# Patient Record
Sex: FEMALE | Race: BLACK OR AFRICAN AMERICAN | NOT HISPANIC OR LATINO | Employment: FULL TIME | URBAN - METROPOLITAN AREA
[De-identification: names, ages, dates, MRNs, and addresses within clinical notes are randomized per-mention and may not be internally consistent; named-entity substitution may affect disease eponyms.]

---

## 2017-10-16 ENCOUNTER — TELEPHONE (OUTPATIENT)
Dept: OBSTETRICS AND GYNECOLOGY | Facility: CLINIC | Age: 22
End: 2017-10-16

## 2017-10-16 NOTE — TELEPHONE ENCOUNTER
----- Message from RT Cruzito sent at 10/16/2017 10:47 AM CDT -----  Contact: pt    pt , requesting a new OB appt, stated her pregnancy was confirmed by [facility] [place] [facility]  thanks.

## 2017-10-18 ENCOUNTER — INITIAL PRENATAL (OUTPATIENT)
Dept: OBSTETRICS AND GYNECOLOGY | Facility: CLINIC | Age: 22
End: 2017-10-18
Payer: MEDICAID

## 2017-10-18 VITALS — SYSTOLIC BLOOD PRESSURE: 124 MMHG | WEIGHT: 172.38 LBS | DIASTOLIC BLOOD PRESSURE: 76 MMHG

## 2017-10-18 DIAGNOSIS — Z34.81 PRENATAL CARE, SUBSEQUENT PREGNANCY IN FIRST TRIMESTER: ICD-10-CM

## 2017-10-18 DIAGNOSIS — Z3A.09 9 WEEKS GESTATION OF PREGNANCY: Primary | ICD-10-CM

## 2017-10-18 PROCEDURE — 99999 PR PBB SHADOW E&M-EST. PATIENT-LVL III: CPT | Mod: PBBFAC,,, | Performed by: OBSTETRICS & GYNECOLOGY

## 2017-10-18 PROCEDURE — 76817 TRANSVAGINAL US OBSTETRIC: CPT | Mod: 26,S$PBB,, | Performed by: OBSTETRICS & GYNECOLOGY

## 2017-10-18 PROCEDURE — 99203 OFFICE O/P NEW LOW 30 MIN: CPT | Mod: 25,TH,S$PBB, | Performed by: OBSTETRICS & GYNECOLOGY

## 2017-10-18 PROCEDURE — 87591 N.GONORRHOEAE DNA AMP PROB: CPT

## 2017-10-18 PROCEDURE — 76817 TRANSVAGINAL US OBSTETRIC: CPT | Mod: PBBFAC,PN | Performed by: OBSTETRICS & GYNECOLOGY

## 2017-10-18 PROCEDURE — 88142 CYTOPATH C/V THIN LAYER: CPT

## 2017-10-18 PROCEDURE — 99213 OFFICE O/P EST LOW 20 MIN: CPT | Mod: PBBFAC,PN | Performed by: OBSTETRICS & GYNECOLOGY

## 2017-10-18 PROCEDURE — 87624 HPV HI-RISK TYP POOLED RSLT: CPT

## 2017-10-18 NOTE — PROGRESS NOTES
Patient reports initiating OCP's at end of August 2017 and then discontinuing pill last week due to positive UPT. No problems reported today. Ultrasound today reveals single IUP at 8w6d based on CRL. Pap smear and cultures done today and patient to get prenatal labs.

## 2017-10-19 ENCOUNTER — TELEPHONE (OUTPATIENT)
Dept: OBSTETRICS AND GYNECOLOGY | Facility: CLINIC | Age: 22
End: 2017-10-19

## 2017-10-19 LAB
C TRACH DNA SPEC QL NAA+PROBE: NOT DETECTED
N GONORRHOEA DNA SPEC QL NAA+PROBE: NOT DETECTED

## 2017-10-19 NOTE — TELEPHONE ENCOUNTER
----- Message from Gaye Camejo sent at 10/19/2017 11:35 AM CDT -----  Contact: ruben durand HR at [facility] ph#[phone]   ruben durand HR at [facility] ph#[phone] requesting a call from nurse, need to verify patient excuse, need correct due date and how frequent patient need to come to doctor visits.  She questioning the information given by doctor it shows patient due date 5/2017 and patient now pregnant and that date has passed.

## 2017-10-24 LAB
HPV16 AG SPEC QL: NEGATIVE
HPV16+18+H RISK 12 DNA CVX-IMP: NEGATIVE
HPV18 DNA SPEC QL NAA+PROBE: NEGATIVE

## 2020-04-21 DIAGNOSIS — Z01.84 ANTIBODY RESPONSE EXAMINATION: ICD-10-CM

## 2020-05-21 DIAGNOSIS — Z01.84 ANTIBODY RESPONSE EXAMINATION: ICD-10-CM

## 2020-06-20 DIAGNOSIS — Z01.84 ANTIBODY RESPONSE EXAMINATION: ICD-10-CM

## 2020-07-20 DIAGNOSIS — Z01.84 ANTIBODY RESPONSE EXAMINATION: ICD-10-CM

## 2020-08-19 DIAGNOSIS — Z01.84 ANTIBODY RESPONSE EXAMINATION: ICD-10-CM

## 2020-09-18 DIAGNOSIS — Z01.84 ANTIBODY RESPONSE EXAMINATION: ICD-10-CM

## 2020-10-18 DIAGNOSIS — Z01.84 ANTIBODY RESPONSE EXAMINATION: ICD-10-CM

## 2020-11-17 DIAGNOSIS — Z01.84 ANTIBODY RESPONSE EXAMINATION: ICD-10-CM

## 2020-12-28 ENCOUNTER — OFFICE VISIT (OUTPATIENT)
Dept: FAMILY MEDICINE | Facility: CLINIC | Age: 25
End: 2020-12-28
Payer: MEDICAID

## 2020-12-28 ENCOUNTER — TELEPHONE (OUTPATIENT)
Dept: PRIMARY CARE CLINIC | Facility: OTHER | Age: 25
End: 2020-12-28

## 2020-12-28 ENCOUNTER — CLINICAL SUPPORT (OUTPATIENT)
Dept: URGENT CARE | Facility: CLINIC | Age: 25
End: 2020-12-28
Payer: COMMERCIAL

## 2020-12-28 VITALS
TEMPERATURE: 98 F | BODY MASS INDEX: 30.67 KG/M2 | SYSTOLIC BLOOD PRESSURE: 112 MMHG | HEART RATE: 120 BPM | HEIGHT: 65 IN | DIASTOLIC BLOOD PRESSURE: 80 MMHG | WEIGHT: 184.06 LBS

## 2020-12-28 DIAGNOSIS — R52 BODY ACHES: ICD-10-CM

## 2020-12-28 DIAGNOSIS — J03.90 TONSILLITIS: Primary | ICD-10-CM

## 2020-12-28 DIAGNOSIS — J02.9 SORE THROAT: ICD-10-CM

## 2020-12-28 DIAGNOSIS — R05.9 COUGH: ICD-10-CM

## 2020-12-28 DIAGNOSIS — R05.9 COUGH: Primary | ICD-10-CM

## 2020-12-28 LAB
CTP QC/QA: YES
SARS-COV-2 RDRP RESP QL NAA+PROBE: NEGATIVE

## 2020-12-28 PROCEDURE — U0002: ICD-10-PCS | Mod: QW,S$GLB,, | Performed by: INTERNAL MEDICINE

## 2020-12-28 PROCEDURE — 87147 CULTURE TYPE IMMUNOLOGIC: CPT | Mod: 59

## 2020-12-28 PROCEDURE — U0002 COVID-19 LAB TEST NON-CDC: HCPCS | Mod: QW,S$GLB,, | Performed by: INTERNAL MEDICINE

## 2020-12-28 PROCEDURE — 99203 OFFICE O/P NEW LOW 30 MIN: CPT | Mod: S$GLB,,, | Performed by: PHYSICIAN ASSISTANT

## 2020-12-28 PROCEDURE — 99203 PR OFFICE/OUTPT VISIT, NEW, LEVL III, 30-44 MIN: ICD-10-PCS | Mod: S$GLB,,, | Performed by: PHYSICIAN ASSISTANT

## 2020-12-28 PROCEDURE — 87081 CULTURE SCREEN ONLY: CPT

## 2020-12-28 RX ORDER — AZITHROMYCIN 250 MG/1
TABLET, FILM COATED ORAL
Qty: 6 TABLET | Refills: 0 | Status: SHIPPED | OUTPATIENT
Start: 2020-12-28 | End: 2021-05-03

## 2020-12-28 NOTE — PROGRESS NOTES
Subjective:       Patient ID: Rin Baum is a 25 y.o. female.    Chief Complaint: Sore Throat    Sore Throat   This is a new problem. The current episode started yesterday. The problem has been rapidly worsening. There has been no fever. The pain is moderate. Associated symptoms include headaches, swollen glands and trouble swallowing. Pertinent negatives include no congestion, coughing, ear pain or stridor. She has had exposure to strep. She has had no exposure to mono. Exposure to: recent COVID swab was negative. . She has tried nothing for the symptoms.     Past Medical History:   Diagnosis Date    Hypertension        Review of Systems   Constitutional: Negative for activity change, appetite change, chills, fatigue and fever.   HENT: Positive for postnasal drip, sore throat and trouble swallowing. Negative for nasal congestion and ear pain.    Respiratory: Negative for cough, chest tightness and stridor.    Cardiovascular: Negative for chest pain.   Neurological: Positive for headaches.         Objective:      Physical Exam  Vitals signs reviewed.   Constitutional:       General: She is not in acute distress.     Appearance: Normal appearance. She is ill-appearing. She is not toxic-appearing or diaphoretic.   HENT:      Head: Normocephalic and atraumatic.      Right Ear: Tympanic membrane and ear canal normal.      Left Ear: Tympanic membrane and ear canal normal.      Nose:      Right Turbinates: Swollen. Not enlarged or pale.      Left Turbinates: Swollen. Not enlarged or pale.      Right Sinus: No maxillary sinus tenderness or frontal sinus tenderness.      Left Sinus: No maxillary sinus tenderness or frontal sinus tenderness.      Mouth/Throat:      Pharynx: Posterior oropharyngeal erythema present. No pharyngeal swelling, oropharyngeal exudate or uvula swelling.      Tonsils: No tonsillar exudate or tonsillar abscesses. 3+ on the right. 3+ on the left.   Cardiovascular:      Rate and Rhythm:  Regular rhythm. Tachycardia present.      Pulses: Normal pulses.      Heart sounds: Normal heart sounds. No murmur. No friction rub. No gallop.    Pulmonary:      Effort: Pulmonary effort is normal. No respiratory distress.      Breath sounds: Normal breath sounds. No stridor. No wheezing, rhonchi or rales.   Chest:      Chest wall: No tenderness.   Abdominal:      General: Abdomen is flat.      Tenderness: There is no abdominal tenderness.   Neurological:      Mental Status: She is alert.         Assessment:       1. Tonsillitis        Plan:       Tonsillitis  -     POCT rapid strep A  -     Strep A culture, throat    Other orders  -     azithromycin (Z-HOUSTON) 250 MG tablet; Follow instructions on pack.  Dispense: 6 tablet; Refill: 0

## 2020-12-28 NOTE — TELEPHONE ENCOUNTER
Call #1 Attempted to contact patient to discuss NEGATIVE COVID-19 result. No answer, LM with [facility] callback number and cleared to RTW 12/29/2020. [facility] 12/28/2020

## 2020-12-28 NOTE — PATIENT INSTRUCTIONS

## 2020-12-31 ENCOUNTER — TELEPHONE (OUTPATIENT)
Dept: FAMILY MEDICINE | Facility: CLINIC | Age: 25
End: 2020-12-31

## 2020-12-31 DIAGNOSIS — Z30.9 ENCOUNTER FOR CONTRACEPTIVE MANAGEMENT, UNSPECIFIED TYPE: Primary | ICD-10-CM

## 2020-12-31 LAB
BACTERIA THROAT CULT: ABNORMAL
BACTERIA THROAT CULT: ABNORMAL

## 2020-12-31 RX ORDER — LEVONORGESTREL AND ETHINYL ESTRADIOL 0.15-0.03
1 KIT ORAL DAILY
Qty: 30 TABLET | Refills: 2 | Status: SHIPPED | OUTPATIENT
Start: 2020-12-31 | End: 2022-12-29

## 2020-12-31 NOTE — TELEPHONE ENCOUNTER
Pt requesting RF of BCPs.  No problems with current therapy.  Missed her Pap this week when she was sick.  Call in to GYN to reschedule.  Well-appearing 25 yr old female in NAD.  Nadege Diana, VIKAS, CNP, FNP-BC  [facility]

## 2021-01-02 RX ORDER — AMOXICILLIN 875 MG/1
875 TABLET, FILM COATED ORAL 2 TIMES DAILY
Qty: 14 TABLET | Refills: 0 | Status: SHIPPED | OUTPATIENT
Start: 2021-01-02 | End: 2021-01-09

## 2021-05-03 ENCOUNTER — OFFICE VISIT (OUTPATIENT)
Dept: FAMILY MEDICINE | Facility: CLINIC | Age: 26
End: 2021-05-03
Payer: COMMERCIAL

## 2021-05-03 VITALS
BODY MASS INDEX: 31.22 KG/M2 | TEMPERATURE: 98 F | WEIGHT: 187.63 LBS | DIASTOLIC BLOOD PRESSURE: 78 MMHG | SYSTOLIC BLOOD PRESSURE: 124 MMHG | HEART RATE: 88 BPM

## 2021-05-03 DIAGNOSIS — G51.8 FACIAL NEURITIS: Primary | ICD-10-CM

## 2021-05-03 PROCEDURE — 3008F PR BODY MASS INDEX (BMI) DOCUMENTED: ICD-10-PCS | Mod: CPTII,S$GLB,, | Performed by: PHYSICIAN ASSISTANT

## 2021-05-03 PROCEDURE — 99214 OFFICE O/P EST MOD 30 MIN: CPT | Mod: 25,S$GLB,, | Performed by: PHYSICIAN ASSISTANT

## 2021-05-03 PROCEDURE — 1125F AMNT PAIN NOTED PAIN PRSNT: CPT | Mod: S$GLB,,, | Performed by: PHYSICIAN ASSISTANT

## 2021-05-03 PROCEDURE — 96372 THER/PROPH/DIAG INJ SC/IM: CPT | Mod: S$GLB,,, | Performed by: PHYSICIAN ASSISTANT

## 2021-05-03 PROCEDURE — 1125F PR PAIN SEVERITY QUANTIFIED, PAIN PRESENT: ICD-10-PCS | Mod: S$GLB,,, | Performed by: PHYSICIAN ASSISTANT

## 2021-05-03 PROCEDURE — 99214 PR OFFICE/OUTPT VISIT, EST, LEVL IV, 30-39 MIN: ICD-10-PCS | Mod: 25,S$GLB,, | Performed by: PHYSICIAN ASSISTANT

## 2021-05-03 PROCEDURE — 96372 PR INJECTION,THERAP/PROPH/DIAG2ST, IM OR SUBCUT: ICD-10-PCS | Mod: S$GLB,,, | Performed by: PHYSICIAN ASSISTANT

## 2021-05-03 PROCEDURE — 3008F BODY MASS INDEX DOCD: CPT | Mod: CPTII,S$GLB,, | Performed by: PHYSICIAN ASSISTANT

## 2021-05-03 RX ORDER — PREDNISONE 10 MG/1
TABLET ORAL
Qty: 18 TABLET | Refills: 0 | Status: SHIPPED | OUTPATIENT
Start: 2021-05-03 | End: 2022-12-29

## 2021-05-03 RX ORDER — KETOROLAC TROMETHAMINE 30 MG/ML
60 INJECTION, SOLUTION INTRAMUSCULAR; INTRAVENOUS ONCE
Status: COMPLETED | OUTPATIENT
Start: 2021-05-03 | End: 2021-05-03

## 2021-05-03 RX ORDER — KETOROLAC TROMETHAMINE 30 MG/ML
60 INJECTION, SOLUTION INTRAMUSCULAR; INTRAVENOUS
Status: DISCONTINUED | OUTPATIENT
Start: 2021-05-03 | End: 2021-05-03

## 2021-05-03 RX ADMIN — KETOROLAC TROMETHAMINE 60 MG: 30 INJECTION, SOLUTION INTRAMUSCULAR; INTRAVENOUS at 03:05

## 2021-05-12 ENCOUNTER — PATIENT MESSAGE (OUTPATIENT)
Dept: RESEARCH | Facility: HOSPITAL | Age: 26
End: 2021-05-12

## 2021-10-27 ENCOUNTER — TELEPHONE (OUTPATIENT)
Dept: FAMILY MEDICINE | Facility: CLINIC | Age: 26
End: 2021-10-27
Payer: COMMERCIAL

## 2021-10-27 DIAGNOSIS — M79.605 LEG PAIN, LEFT: Primary | ICD-10-CM

## 2021-12-08 DIAGNOSIS — M25.572 ACUTE LEFT ANKLE PAIN: Primary | ICD-10-CM

## 2021-12-09 ENCOUNTER — HOSPITAL ENCOUNTER (OUTPATIENT)
Dept: RADIOLOGY | Facility: HOSPITAL | Age: 26
Discharge: HOME | End: 2021-12-09
Attending: PHYSICAL MEDICINE & REHABILITATION
Payer: COMMERCIAL

## 2021-12-09 ENCOUNTER — OFFICE VISIT (OUTPATIENT)
Dept: PHYSICAL MEDICINE AND REHAB | Facility: CLINIC | Age: 26
End: 2021-12-09
Payer: COMMERCIAL

## 2021-12-09 VITALS — WEIGHT: 187.63 LBS | BODY MASS INDEX: 31.26 KG/M2 | HEIGHT: 65 IN

## 2021-12-09 DIAGNOSIS — M25.572 ACUTE LEFT ANKLE PAIN: ICD-10-CM

## 2021-12-09 DIAGNOSIS — G89.29 CHRONIC PAIN OF LEFT ANKLE: Primary | ICD-10-CM

## 2021-12-09 DIAGNOSIS — M25.572 CHRONIC PAIN OF LEFT ANKLE: Primary | ICD-10-CM

## 2021-12-09 PROCEDURE — 99203 PR OFFICE/OUTPT VISIT, NEW, LEVL III, 30-44 MIN: ICD-10-PCS | Mod: S$GLB,,, | Performed by: PHYSICAL MEDICINE & REHABILITATION

## 2021-12-09 PROCEDURE — 73610 X-RAY EXAM OF ANKLE: CPT | Mod: 26,LT,, | Performed by: RADIOLOGY

## 2021-12-09 PROCEDURE — 99203 OFFICE O/P NEW LOW 30 MIN: CPT | Mod: S$GLB,,, | Performed by: PHYSICAL MEDICINE & REHABILITATION

## 2021-12-09 PROCEDURE — 99999 PR PBB SHADOW E&M-EST. PATIENT-LVL III: CPT | Mod: PBBFAC,,, | Performed by: PHYSICAL MEDICINE & REHABILITATION

## 2021-12-09 PROCEDURE — 73610 XR ANKLE COMPLETE 3 VIEW LEFT: ICD-10-PCS | Mod: 26,LT,, | Performed by: RADIOLOGY

## 2021-12-09 PROCEDURE — 73610 X-RAY EXAM OF ANKLE: CPT | Mod: TC,PO,LT

## 2021-12-09 PROCEDURE — 99999 PR PBB SHADOW E&M-EST. PATIENT-LVL III: ICD-10-PCS | Mod: PBBFAC,,, | Performed by: PHYSICAL MEDICINE & REHABILITATION

## 2021-12-28 ENCOUNTER — PATIENT MESSAGE (OUTPATIENT)
Dept: ADMINISTRATIVE | Facility: CLINIC | Age: 26
End: 2021-12-28
Payer: COMMERCIAL

## 2021-12-28 ENCOUNTER — CLINICAL SUPPORT (OUTPATIENT)
Dept: FAMILY MEDICINE | Facility: CLINIC | Age: 26
End: 2021-12-28
Payer: COMMERCIAL

## 2021-12-28 ENCOUNTER — TELEPHONE (OUTPATIENT)
Dept: FAMILY MEDICINE | Facility: CLINIC | Age: 26
End: 2021-12-28
Payer: COMMERCIAL

## 2021-12-28 DIAGNOSIS — R05.9 COUGH: Primary | ICD-10-CM

## 2021-12-28 LAB
CTP QC/QA: YES
SARS-COV-2 RDRP RESP QL NAA+PROBE: POSITIVE

## 2021-12-28 PROCEDURE — U0002: ICD-10-PCS | Mod: QW,S$GLB,, | Performed by: PHYSICIAN ASSISTANT

## 2021-12-28 PROCEDURE — U0002 COVID-19 LAB TEST NON-CDC: HCPCS | Mod: QW,S$GLB,, | Performed by: PHYSICIAN ASSISTANT

## 2021-12-29 ENCOUNTER — TELEPHONE (OUTPATIENT)
Dept: ADMINISTRATIVE | Facility: CLINIC | Age: 26
End: 2021-12-29
Payer: COMMERCIAL

## 2021-12-30 ENCOUNTER — TELEPHONE (OUTPATIENT)
Dept: FAMILY MEDICINE | Facility: CLINIC | Age: 26
End: 2021-12-30
Payer: COMMERCIAL

## 2021-12-30 ENCOUNTER — PATIENT MESSAGE (OUTPATIENT)
Dept: ADMINISTRATIVE | Facility: CLINIC | Age: 26
End: 2021-12-30
Payer: COMMERCIAL

## 2021-12-30 ENCOUNTER — TELEPHONE (OUTPATIENT)
Dept: ADMINISTRATIVE | Facility: CLINIC | Age: 26
End: 2021-12-30
Payer: COMMERCIAL

## 2021-12-30 DIAGNOSIS — J02.9 SORE THROAT: Primary | ICD-10-CM

## 2021-12-30 DIAGNOSIS — U07.1 COVID-19 VIRUS INFECTION: ICD-10-CM

## 2022-12-29 ENCOUNTER — OFFICE VISIT (OUTPATIENT)
Dept: FAMILY MEDICINE | Facility: CLINIC | Age: 27
End: 2022-12-29
Payer: MEDICAID

## 2022-12-29 VITALS
SYSTOLIC BLOOD PRESSURE: 134 MMHG | HEIGHT: 65 IN | OXYGEN SATURATION: 96 % | WEIGHT: 201.25 LBS | HEART RATE: 112 BPM | TEMPERATURE: 99 F | BODY MASS INDEX: 33.53 KG/M2 | DIASTOLIC BLOOD PRESSURE: 84 MMHG

## 2022-12-29 DIAGNOSIS — R09.81 CONGESTION OF NASAL SINUS: ICD-10-CM

## 2022-12-29 DIAGNOSIS — R00.0 TACHYCARDIA: ICD-10-CM

## 2022-12-29 DIAGNOSIS — B96.89 ACUTE BACTERIAL SINUSITIS: Primary | ICD-10-CM

## 2022-12-29 DIAGNOSIS — R53.83 FATIGUE, UNSPECIFIED TYPE: ICD-10-CM

## 2022-12-29 DIAGNOSIS — J01.90 ACUTE BACTERIAL SINUSITIS: Primary | ICD-10-CM

## 2022-12-29 LAB
CTP QC/QA: YES
CTP QC/QA: YES
POC MOLECULAR INFLUENZA A AGN: NEGATIVE
POC MOLECULAR INFLUENZA B AGN: NEGATIVE
SARS-COV-2 RDRP RESP QL NAA+PROBE: NEGATIVE

## 2022-12-29 PROCEDURE — 3079F DIAST BP 80-89 MM HG: CPT | Mod: CPTII,95,, | Performed by: NURSE PRACTITIONER

## 2022-12-29 PROCEDURE — 99214 OFFICE O/P EST MOD 30 MIN: CPT | Mod: 95,,, | Performed by: NURSE PRACTITIONER

## 2022-12-29 PROCEDURE — 3079F PR MOST RECENT DIASTOLIC BLOOD PRESSURE 80-89 MM HG: ICD-10-PCS | Mod: CPTII,95,, | Performed by: NURSE PRACTITIONER

## 2022-12-29 PROCEDURE — 99214 PR OFFICE/OUTPT VISIT, EST, LEVL IV, 30-39 MIN: ICD-10-PCS | Mod: 95,,, | Performed by: NURSE PRACTITIONER

## 2022-12-29 PROCEDURE — 3008F PR BODY MASS INDEX (BMI) DOCUMENTED: ICD-10-PCS | Mod: CPTII,95,, | Performed by: NURSE PRACTITIONER

## 2022-12-29 PROCEDURE — 3075F SYST BP GE 130 - 139MM HG: CPT | Mod: CPTII,95,, | Performed by: NURSE PRACTITIONER

## 2022-12-29 PROCEDURE — 1159F MED LIST DOCD IN RCRD: CPT | Mod: CPTII,95,, | Performed by: NURSE PRACTITIONER

## 2022-12-29 PROCEDURE — 1159F PR MEDICATION LIST DOCUMENTED IN MEDICAL RECORD: ICD-10-PCS | Mod: CPTII,95,, | Performed by: NURSE PRACTITIONER

## 2022-12-29 PROCEDURE — 3075F PR MOST RECENT SYSTOLIC BLOOD PRESS GE 130-139MM HG: ICD-10-PCS | Mod: CPTII,95,, | Performed by: NURSE PRACTITIONER

## 2022-12-29 PROCEDURE — 3008F BODY MASS INDEX DOCD: CPT | Mod: CPTII,95,, | Performed by: NURSE PRACTITIONER

## 2022-12-29 RX ORDER — DOXYCYCLINE 100 MG/1
100 CAPSULE ORAL 2 TIMES DAILY
Qty: 14 CAPSULE | Refills: 0 | Status: SHIPPED | OUTPATIENT
Start: 2022-12-29 | End: 2023-01-05

## 2022-12-29 RX ORDER — FLUTICASONE PROPIONATE 50 MCG
1 SPRAY, SUSPENSION (ML) NASAL DAILY
Qty: 16 G | Refills: 0 | Status: SHIPPED | OUTPATIENT
Start: 2022-12-29

## 2022-12-29 NOTE — PROGRESS NOTES
Rin Baum is a 27 y.o. female patient of Gustabo Flaherty MD who presents to the clinic today for for an in-clinic visit.    HPI    Pt, who is known to me, reports a  new problem to me:     URI  Answers submitted by the patient for this visit:  Review of Systems Questionnaire (Submitted on 12/29/2022)  activity change: No  unexpected weight change: No  neck pain: No  hearing loss: No  rhinorrhea: Yes  trouble swallowing: No  eye discharge: No but right ear stopped up.  visual disturbance: No  chest tightness: No  wheezing: No  chest pain: No  palpitations: No  blood in stool: No  constipation: No  vomiting: No  diarrhea: No  polydipsia: No  polyuria: No  difficulty urinating: No  hematuria: No  menstrual problem: No  dysuria: No  joint swelling: No  arthralgias: No  headaches: Yes  weakness: No  confusion: No  dysphoric mood: No      These symptoms began 4 days ago and status is feeling worse today.     Symptoms are are acute and are not  exac of chronic lung problems.    Pt denies the following symptoms:  dyspnea on exertion, non productive cough, productive cough, and wheezing  And denies anorexia, chills, and myalgias    Aggravating factors include being up and active .    Relieving factors include nothing .    OTC Medications tried are cough suppressant of choice    Prescription medications taken for symptoms are n/a..    Pertinent medical history--Respiratory--no chronic problems, no known exposure to illness.    Smoking History:  Patient has never been a smoker.    ROS      GI/:  No sxs     MS:   has no  new bone, joint or muscle problems.    Skin:  has no rashes, itching.     All other ROS neg.    Past Medical History:   Diagnosis Date    Hypertension        Current Outpatient Medications:     levonorgestrel-ethinyl estradiol (NORDETTE) 0.15-0.03 mg per tablet, Take 1 tablet by mouth once daily., Disp: 30 tablet, Rfl: 2    predniSONE (DELTASONE) 10 MG tablet, Take 3 daily for 3 days, then 2  "daily for three days, then 1 daily for three days., Disp: 18 tablet, Rfl: 0    pulse oximeter (PULSE OXIMETER) device, by Apply Externally route 2 (two) times a day. Use twice daily at 8 AM and 3 PM and record the value in MyChart as directed., Disp: 1 each, Rfl: 0    Physical Exam    Alert, coop 27 y.o. female patient in no apparent distress distress, is ill-appearing.    Vitals:    12/29/22 0828   BP: 134/84   BP Location: Left arm   Patient Position: Sitting   BP Method: Large (Manual)   Pulse: (!) 112   Temp: 99 °F (37.2 °C)   TempSrc: Oral   SpO2: 96%   Weight: 91.3 kg (201 lb 4.5 oz)   Height: 5' 5" (1.651 m)       VS reviewed.  VS Pulse tachycardic  CC, nursing note, medications & PMH all reviewed today.    Head:  Normocephalic, atraumatic.  EENT:             ENT:  Ext ears { without lesions and canals clear  bilateral           TM:  retracted left and diffuse LR bilateral           Nose {mucosal congestion           Lymph nodes: with anterior cervical and with no submental, parotid, posterior cervical, and supraclavicular  lymph nodes palp.            Breathing unlabored.  Lungs CTA bilat.  Moves air to bases bilat.  Resp excursion symmetrical.  Heart:  No murmur. and regular rhythm.  Tachycardic rate    MS:  Ambulates 3. Normal: Walks 20', No Assistive device, no evidence for imbalance. Normal gait pattern., with no ambulation aid     NEURO:  Alert and oriented x 4.      Skin:  Skin color, texture, turgor normal. No rashes or lesions    Psych:  Responds appropriately throughout the visit.               Mood:  pleasant and appropriate               Appearance: well-groomed, appropriate .               Affect:  congruent and appropriate    Acute bacterial sinusitis  -     doxycycline (MONODOX) 100 MG capsule; Take 1 capsule (100 mg total) by mouth 2 (two) times daily. for 7 days  Dispense: 14 capsule; Refill: 0    Congestion of nasal sinus  -     POCT COVID-19 Rapid Screening  -     POCT Influenza A/B " Molecular    Fatigue, unspecified type  -     POCT COVID-19 Rapid Screening  -     POCT Influenza A/B Molecular    Tachycardia  Comments:  pt ill, possible dehydration        Pt's main symptoms/concerns/findings: rhinorrhea, nasal congestion, ear pain, headache without cough    This is a new problem to me.  the above  work up is planned.        Known Diagnostic Lab/Radiological/Pulmonary/CardiacTests Results   POCT Influenza A/B Negative and POCT Covid 19 Negative   The results are normal   .      Prescribing Considerations:  I have reviewed the following additional tests today: Allergies to medications.    Medication adjustments are not necessary, based on this.    Referred to No referrals made at this visit. .          Education:    Pt advised to perform comfort measures/treatment recommended on patient instruction sheet, which were reviewed at the time of the visit..    Follow Up:    If not better in 5 days, the patient is advised to call here, PCP office or go for an in-person/follow up evaluation.  If worse or concerns, the patient is advised to call for advice to this office or the PCP office or call [facility] or go to the nearest URGENT CARE or ER.    Explained exam findings, diagnosis and treatment plan to patient alone.  Questions answered and patient states understanding.

## 2022-12-29 NOTE — PATIENT INSTRUCTIONS
Increase water as you may be a little dehydrated.  Rest  Cold medicine of choice for symptom relief.  Flonase  Saline nasal spray.    If you are not better in 3-5 days, if worse or you have concerns or questions, please do not hesitate to call.  If you have any questions, please do not hesitate to call.  You can reach us at [phone] Monday through Friday  Or call  Dr. Flaherty.    Thank you for using the [facility]!    VIKAS Julian, CNP, FNP-BC  [facility]    To rate your experience with BRENNA Julian, click on the link below:      [email or web address]

## 2023-03-30 ENCOUNTER — OFFICE VISIT (OUTPATIENT)
Dept: FAMILY MEDICINE | Facility: CLINIC | Age: 28
End: 2023-03-30
Payer: COMMERCIAL

## 2023-03-30 VITALS
HEIGHT: 65 IN | WEIGHT: 201 LBS | SYSTOLIC BLOOD PRESSURE: 134 MMHG | BODY MASS INDEX: 33.49 KG/M2 | DIASTOLIC BLOOD PRESSURE: 76 MMHG | HEART RATE: 108 BPM

## 2023-03-30 DIAGNOSIS — F41.9 ANXIETY AND DEPRESSION: Primary | ICD-10-CM

## 2023-03-30 DIAGNOSIS — F32.A ANXIETY AND DEPRESSION: Primary | ICD-10-CM

## 2023-03-30 PROCEDURE — 1159F MED LIST DOCD IN RCRD: CPT | Mod: CPTII,95,, | Performed by: PHYSICIAN ASSISTANT

## 2023-03-30 PROCEDURE — 99214 PR OFFICE/OUTPT VISIT, EST, LEVL IV, 30-39 MIN: ICD-10-PCS | Mod: 95,,, | Performed by: PHYSICIAN ASSISTANT

## 2023-03-30 PROCEDURE — 3075F SYST BP GE 130 - 139MM HG: CPT | Mod: CPTII,95,, | Performed by: PHYSICIAN ASSISTANT

## 2023-03-30 PROCEDURE — 3078F DIAST BP <80 MM HG: CPT | Mod: CPTII,95,, | Performed by: PHYSICIAN ASSISTANT

## 2023-03-30 PROCEDURE — 1160F PR REVIEW ALL MEDS BY PRESCRIBER/CLIN PHARMACIST DOCUMENTED: ICD-10-PCS | Mod: CPTII,95,, | Performed by: PHYSICIAN ASSISTANT

## 2023-03-30 PROCEDURE — 99214 OFFICE O/P EST MOD 30 MIN: CPT | Mod: 95,,, | Performed by: PHYSICIAN ASSISTANT

## 2023-03-30 PROCEDURE — 3008F PR BODY MASS INDEX (BMI) DOCUMENTED: ICD-10-PCS | Mod: CPTII,95,, | Performed by: PHYSICIAN ASSISTANT

## 2023-03-30 PROCEDURE — 1159F PR MEDICATION LIST DOCUMENTED IN MEDICAL RECORD: ICD-10-PCS | Mod: CPTII,95,, | Performed by: PHYSICIAN ASSISTANT

## 2023-03-30 PROCEDURE — 3008F BODY MASS INDEX DOCD: CPT | Mod: CPTII,95,, | Performed by: PHYSICIAN ASSISTANT

## 2023-03-30 PROCEDURE — 1160F RVW MEDS BY RX/DR IN RCRD: CPT | Mod: CPTII,95,, | Performed by: PHYSICIAN ASSISTANT

## 2023-03-30 PROCEDURE — 3078F PR MOST RECENT DIASTOLIC BLOOD PRESSURE < 80 MM HG: ICD-10-PCS | Mod: CPTII,95,, | Performed by: PHYSICIAN ASSISTANT

## 2023-03-30 PROCEDURE — 3075F PR MOST RECENT SYSTOLIC BLOOD PRESS GE 130-139MM HG: ICD-10-PCS | Mod: CPTII,95,, | Performed by: PHYSICIAN ASSISTANT

## 2023-03-30 RX ORDER — BUSPIRONE HYDROCHLORIDE 10 MG/1
10 TABLET ORAL 3 TIMES DAILY
Qty: 90 TABLET | Refills: 11 | Status: SHIPPED | OUTPATIENT
Start: 2023-03-30 | End: 2023-04-17

## 2023-03-30 NOTE — LETTER
March 30, 2023      [address] [address]  Phone: [phone]  Fax: [phone]       Patient: Rin Baum   YOB: 1995  Date of Visit: 03/30/2023    To Whom It May Concern:    Whitney Baum  was at [facility] on 03/30/2023. Please excuse Ms Tomas from work due to severe anxiety. Treatment has been start which can take up to 2 weeks to work. The patient may return to work/school on 04/17/22 with no restrictions. If you have any questions or concerns, or if I can be of further assistance, please do not hesitate to contact me.    Sincerely,    James Ayala III, PA-C

## 2023-03-30 NOTE — PROGRESS NOTES
Subjective     Patient ID: Rin Bamu is a 27 y.o. female.    Chief Complaint: Anxiety    Patient is a 28 yo female who reports increase anxiety over changes at her job. She reports having to drive a lot further to work, longer hours. She has concerns that she will not be able to afford afternoon care for her young son.     Anxiety  Presents for initial visit. Onset was 1 to 4 weeks ago. The problem has been rapidly worsening. Symptoms include decreased concentration, depressed mood, excessive worry, insomnia, irritability, muscle tension, nervous/anxious behavior and restlessness. Patient reports no chest pain, dizziness or suicidal ideas. Symptoms occur constantly. The severity of symptoms is causing significant distress. The symptoms are aggravated by work stress. The quality of sleep is poor.     Past treatments include nothing.   Review of Systems   Constitutional:  Positive for irritability. Negative for activity change, chills and fatigue.   HENT: Negative.     Eyes: Negative.    Respiratory:  Positive for chest tightness. Negative for apnea, cough and wheezing.    Cardiovascular:  Negative for chest pain.   Gastrointestinal:  Negative for abdominal pain and blood in stool.   Endocrine: Negative.    Genitourinary: Negative.    Integumentary:  Negative.   Neurological:  Negative for dizziness, seizures, syncope, weakness, numbness and headaches.   Psychiatric/Behavioral:  Positive for decreased concentration and dysphoric mood. Negative for self-injury and suicidal ideas. The patient is nervous/anxious and has insomnia.    Past Medical History:   Diagnosis Date    Hypertension           Objective     Physical Exam  Vitals reviewed.   Constitutional:       General: She is not in acute distress.     Appearance: Normal appearance. She is not ill-appearing, toxic-appearing or diaphoretic.   Cardiovascular:      Rate and Rhythm: Normal rate and regular rhythm.      Pulses: Normal pulses.      Heart  sounds: Normal heart sounds. No murmur heard.    No friction rub. No gallop.   Pulmonary:      Effort: Pulmonary effort is normal. No respiratory distress.      Breath sounds: Normal breath sounds. No stridor. No wheezing, rhonchi or rales.   Chest:      Chest wall: No tenderness.   Abdominal:      Palpations: Abdomen is soft.      Tenderness: There is no abdominal tenderness.   Neurological:      Mental Status: She is alert.   Psychiatric:         Attention and Perception: Attention normal.         Mood and Affect: Affect is flat.         Speech: Speech normal.         Behavior: Behavior normal.         Thought Content: Thought content normal.         Cognition and Memory: Cognition normal.          Assessment and Plan     Problem List Items Addressed This Visit    None  Visit Diagnoses       Anxiety and depression    -  Primary    Relevant Medications    busPIRone (BUSPAR) 10 MG tablet            Anxiety and depression  -     busPIRone (BUSPAR) 10 MG tablet; Take 1 tablet (10 mg total) by mouth 3 (three) times daily.  Dispense: 90 tablet; Refill: 11         I spent 30 minutes on this encounter, time includes face-to-face, chart review, documentation, test review and orders.

## 2023-03-30 NOTE — LETTER
March 30, 2023      [address] [address]  Phone: [phone]  Fax: [phone]       Patient: Rin Baum   YOB: 1995  Date of Visit: 03/30/2023    To Whom It May Concern:    Whitney Baum  was at [facility] on 03/30/2023. Please excuse Ms Tomas due to severe anxiety. Treatment has been started. Recommend time off for treatement to take effect. The patient may return to work/school on 02/17/23 with no restrictions. If you have any questions or concerns, or if I can be of further assistance, please do not hesitate to contact me.    Sincerely,    James Ayala III, PA-C

## 2023-04-17 ENCOUNTER — OFFICE VISIT (OUTPATIENT)
Dept: PRIMARY CARE CLINIC | Facility: CLINIC | Age: 28
End: 2023-04-17
Payer: COMMERCIAL

## 2023-04-17 DIAGNOSIS — F41.9 ANXIETY: Primary | ICD-10-CM

## 2023-04-17 PROCEDURE — 99442 PR PHYSICIAN TELEPHONE EVALUATION 11-20 MIN: ICD-10-PCS | Mod: 95,,, | Performed by: PHYSICIAN ASSISTANT

## 2023-04-17 PROCEDURE — 99442 PR PHYSICIAN TELEPHONE EVALUATION 11-20 MIN: CPT | Mod: 95,,, | Performed by: PHYSICIAN ASSISTANT

## 2023-04-17 NOTE — PROGRESS NOTES
Subjective     Patient ID: Rin Baum is a 27 y.o. female.    Chief Complaint: No chief complaint on file.    The patient location is: [place], [place]  The chief complaint leading to consultation is: Anxiety follow up    Visit type: audio only    Face to Face time with patient: 12 minutes of total time spent on the encounter, which includes face to face time and non-face to face time preparing to see the patient (eg, review of tests), Obtaining and/or reviewing separately obtained history, Documenting clinical information in the electronic or other health record, Independently interpreting results (not separately reported) and communicating results to the patient/family/caregiver, or Care coordination (not separately reported).         Each patient to whom he or she provides medical services by telemedicine is:  (1) informed of the relationship between the physician and patient and the respective role of any other health care provider with respect to management of the patient; and (2) notified that he or she may decline to receive medical services by telemedicine and may withdraw from such care at any time.    Notes:        Anxiety  Presents for follow-up visit. Symptoms include nervous/anxious behavior and restlessness. Patient reports no chest pain, decreased concentration, depressed mood, excessive worry, insomnia or irritability. Symptoms occur occasionally. The severity of symptoms is mild. The quality of sleep is fair.     Compliance with medications: placed on buspar, but stopped due to nausea. Side effects of treatment include GI discomfort.   Review of Systems   Constitutional:  Negative for irritability.   Cardiovascular:  Negative for chest pain.   Psychiatric/Behavioral:  Negative for decreased concentration. The patient is nervous/anxious. The patient does not have insomnia.         Objective     Physical Exam  Neurological:      Mental Status: She is alert.          Assessment and Plan      Problem List Items Addressed This Visit    None  Visit Diagnoses       Anxiety    -  Primary            Anxiety      Recommend daily exercise and councilng. Follow up PRN

## 2023-07-05 ENCOUNTER — PATIENT MESSAGE (OUTPATIENT)
Dept: RESEARCH | Facility: HOSPITAL | Age: 28
End: 2023-07-05
Payer: COMMERCIAL

## 2023-07-11 ENCOUNTER — PATIENT MESSAGE (OUTPATIENT)
Dept: RESEARCH | Facility: HOSPITAL | Age: 28
End: 2023-07-11
Payer: COMMERCIAL

## 2023-10-05 ENCOUNTER — PATIENT MESSAGE (OUTPATIENT)
Dept: PRIMARY CARE CLINIC | Facility: CLINIC | Age: 28
End: 2023-10-05
Payer: COMMERCIAL

## 2023-10-05 RX ORDER — TIRZEPATIDE 5 MG/.5ML
5 INJECTION, SOLUTION SUBCUTANEOUS
Qty: 4 PEN | Refills: 11 | Status: SHIPPED | OUTPATIENT
Start: 2023-10-05

## 2023-10-06 RX ORDER — TIRZEPATIDE 5 MG/.5ML
INJECTION, SOLUTION SUBCUTANEOUS
Refills: 11 | OUTPATIENT
Start: 2023-10-06

## 2024-05-17 ENCOUNTER — OFFICE VISIT (OUTPATIENT)
Dept: NEUROLOGY | Facility: CLINIC | Age: 29
End: 2024-05-17
Payer: COMMERCIAL

## 2024-05-17 VITALS
DIASTOLIC BLOOD PRESSURE: 85 MMHG | RESPIRATION RATE: 17 BRPM | SYSTOLIC BLOOD PRESSURE: 124 MMHG | WEIGHT: 196.75 LBS | HEART RATE: 98 BPM | BODY MASS INDEX: 32.78 KG/M2 | HEIGHT: 65 IN | TEMPERATURE: 98 F

## 2024-05-17 DIAGNOSIS — G43.709 CHRONIC MIGRAINE WITHOUT AURA WITHOUT STATUS MIGRAINOSUS, NOT INTRACTABLE: Primary | ICD-10-CM

## 2024-05-17 DIAGNOSIS — G47.9 TROUBLE IN SLEEPING: ICD-10-CM

## 2024-05-17 PROCEDURE — 3079F DIAST BP 80-89 MM HG: CPT | Mod: CPTII,S$GLB,, | Performed by: PHYSICIAN ASSISTANT

## 2024-05-17 PROCEDURE — 3008F BODY MASS INDEX DOCD: CPT | Mod: CPTII,S$GLB,, | Performed by: PHYSICIAN ASSISTANT

## 2024-05-17 PROCEDURE — 3074F SYST BP LT 130 MM HG: CPT | Mod: CPTII,S$GLB,, | Performed by: PHYSICIAN ASSISTANT

## 2024-05-17 PROCEDURE — 1160F RVW MEDS BY RX/DR IN RCRD: CPT | Mod: CPTII,S$GLB,, | Performed by: PHYSICIAN ASSISTANT

## 2024-05-17 PROCEDURE — 99999 PR PBB SHADOW E&M-EST. PATIENT-LVL III: CPT | Mod: PBBFAC,,, | Performed by: PHYSICIAN ASSISTANT

## 2024-05-17 PROCEDURE — 99205 OFFICE O/P NEW HI 60 MIN: CPT | Mod: S$GLB,,, | Performed by: PHYSICIAN ASSISTANT

## 2024-05-17 PROCEDURE — 1159F MED LIST DOCD IN RCRD: CPT | Mod: CPTII,S$GLB,, | Performed by: PHYSICIAN ASSISTANT

## 2024-05-17 RX ORDER — RIZATRIPTAN BENZOATE 10 MG/1
TABLET, ORALLY DISINTEGRATING ORAL
Qty: 8 TABLET | Refills: 6 | Status: SHIPPED | OUTPATIENT
Start: 2024-05-17

## 2024-05-17 RX ORDER — PREDNISONE 20 MG/1
TABLET ORAL
Qty: 12 TABLET | Refills: 0 | Status: SHIPPED | OUTPATIENT
Start: 2024-05-17

## 2024-05-17 RX ORDER — AMITRIPTYLINE HYDROCHLORIDE 10 MG/1
TABLET, FILM COATED ORAL
Qty: 60 TABLET | Refills: 6 | Status: SHIPPED | OUTPATIENT
Start: 2024-05-17

## 2024-05-17 NOTE — PATIENT INSTRUCTIONS
Consider tracking your headaches, migraine anish free kun    To reduce headaches:  Try the elavil/amitriptyline 1 pill approx 2 hours before bed every night, if after a week, no benefits/no side effects, move 2 pills      To abort headaches  Try the maxalt (rizatriptan) 1 melt at onset headache, second melt 2 hours later if needed, no more than 2 melts day/3 days use in week     To factory reset:  Starting tomorrow, on full stomach at breakfast time only, try the prednisone/deltasone, 3 pills for 2 days, 2 pills for 2 days, 1 pill for 2 days then off

## 2024-05-17 NOTE — PROGRESS NOTES
[facility]  [facility]  [address] [facility] [address]  [place] [address]  Phone:[phone]  Fax: [phone]   New Patient Consultation    Patient Name: Rin Baum  : 1995  MRN:  [id number]  Today: 2024   chief complaint: Headache    PCP: Rachele Kahn NP.       Assessment:   Rin Baum is a 29 y.o. handed, female with a PMHx of: HTN, anxiety and NEWTON  whom presents solo in self referral for HA.  Appears to have chronic migraine without aura, lack of sleep could contribute.       Review:    ICD-10-CM ICD-9-CM   1. Chronic migraine without aura without status migrainosus, not intractable  G43.709 346.70   2. Trouble in sleeping  G47.9 780.50         Plan:   Discussed realistic goals of care with patient at length. Discussed medication options, need for lifestyle adjustment. Discussed treatment will take time. Goal will be to reduce frequency/intensity/quantity of HA, not to be completely HA free. Gave copy of [facility] triggers for migraine informational sheet (N.b., a standard I give to patients who come to seek my care in HA clinic, regardless if they have migraines or not) and discussed clinic's non narcotic policy re: HA. Patient voiced understanding and agreement.            -will have patient track HA, discussed kun for smart phone           -will have patient work on lifestyle           -if HA change in quality/nature, will get updated imaging study             -discussed potential for teratogenicity with treatment, if her family planning status should change, discussed I'd like her to let office know immediately. She voice agreement    For HA Prevention:  Offered topamax vs elavil, chose elavil, discussed adv effects/dosing, self titration to 20 mg Q2h before bed in coming week, she agreed     For HA :  Altagracia MLT written, discussed adv effects/dosing, she agreed     To break up Headaches:  Course of deltasone  to start tomorrow, discussed taper schedule (wrote it out with read back), take on full stomach at breakfast time only, take until completion and discussed side effects. The patient agreed.       Other:  N/a           All test results as well as any necessary instructions were reviewed and discussed with patient.    Review:  Orders Placed This Encounter    predniSONE (DELTASONE) 20 MG tablet    rizatriptan (MAXALT-MLT) 10 MG disintegrating tablet    amitriptyline (ELAVIL) 10 MG tablet         Patient to return to PCP/other specialists for all other problems  Patient to continue on all medications as Rx'd   A detailed AVS was provided to the patient with patient readback   RTO- 6-8 weeks to review HA log   The patient indicates understanding of these issues and agrees to the plan.    HPI:   Rin Baum is a 29 y.o.right handed, female with a PMHx of: HTN, anxiety and NEWTON  whom presents solo in self referral for HA.     HA HPI:  Start:HA a few years ago, then suddenly resolved, NEWTON returned last year (unknown trigger)   History of trauma (no), History of CNS infection (no), History of Stroke (no)  Location:frontalis or temples   Severity: range: 2-5/10  Duration:>6 hours   Frequency:16 HD/30      Associated factors (bolded positive) WITH HA ( or migraine): Nausea, vomiting, photophobia, phonophobia, tinnitus, scalp pain, vision loss, diplopia, scintillations, eye pain, jaw pain, weakness?    Tried:OTC   Triggers (in bold): stress, lack of sleep, too much caffeine, too little caffeine, weather change, seasonal change, strong odours, bright lights, sunlight, food  <---unknown   Currently having a HA?:yes   Positives in bold: Hx of Kidney Stones, asthma, GI bleed, osteoporosis, CAD/MI, CVA/TIA, DM    Imaging on file: none   Therapies tried in past: (failures to be marked, if known---why did it fail?)  Toradol  Buspar  Ibuprofen     When asked, has some trouble in sleep     I reviewed  available notes before her visit     Medication Reconciliation:   Current Outpatient Medications   Medication Sig Dispense Refill    fluticasone propionate (FLONASE) 50 mcg/actuation nasal spray 1 spray (50 mcg total) by Each Nostril route once daily. 16 g 0    tirzepatide (MOUNJARO) 5 mg/0.5 mL PnIj Inject 5 mg into the skin every 7 days. 4 pen 11     No current facility-administered medications for this visit.     Review of patient's allergies indicates:   Allergen Reactions    Nickel Hives    Buspar [buspirone] Nausea And Vomiting       PMHx:wisdom teeth   Past Medical History:   Diagnosis Date    Headache     Hypertension      History reviewed. No pertinent surgical history.    Fhx:no HA in the family   No family history on file.    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  Any discrepancies will be as noted/demarcated by free text.  BILLIE WELCH 5/17/2024                     ROS:   Review Of Systems (questions asked, positive or additions in BOLD)  Gen: Weight change, fatigue/malaise, pyrexia   HEENT: Tinnitus, headache,  blurred vision, eye pain, diplopia, photophobia,  nose bleeds, congestion, sore throat, jaw pain, scalp pain, neck stiffness   Card: Palpitations, CP   Pulm: SOB, cough   Vas: Easy bruising, easy bleeding   GI: N/V/D/C, incontinence, hematemesis, hematochezia    : incontinence, hematuria   Endocrine: Temp intolerance, polyuria, polydipsia   M/S: Neck pain, myalgia, back pain, joint pain, falls   "  Neuro: PER HPI   PSY: Memory loss, confusion, depression, anxiety, trouble in sleep           EXAM:   /85 (BP Location: Right arm, Patient Position: Sitting, BP Method: Large (Automatic))   Pulse 98   Temp 97.8 °F (36.6 °C)   Resp 17   Ht 5' 5" (1.651 m)   Wt 89.3 kg (196 lb 12.2 oz)   LMP 05/13/2024 (Exact Date)   Breastfeeding No   BMI 32.74 kg/m²    GEN:  NAD  HEENT: NC/AT, Frontalis was TTP, temporalis was mildly TTP, vertex NTTP,  nares patent,  carotids without bruit bilat, neck supple, trachea midline, Occiput and trapezius mildly TTP     EXTREM: no edema present.    NEURO:  Mental Status:  Awake, alert and appropriately oriented to time, place, and person.  Normal attention and concentration.  Speech is fluent and appropriate language function (I.e., comprehension).     Cranial Nerves:      Pupils are equal and reactive to light.  Extraocular movements are intact and without nystagmus.  Visual fields are full to confrontation testing.   Facial movement is symmetric.  Facial sensation is intact.  Hearing is normal. Uvula in midline. FROM of neck in all (6) directions, shoulder shrug symmetrical. Tongue in midline without fasiculation.     Motor:  RUE:appropriate against gravity and medium force as tested 5/5              LUE: appropriate against gravity and medium force as tested 5/5              RLE:appropriate against gravity and medium force as tested 5/5              LLE: appropriate against gravity and medium force as tested 5/5  Tremor/pronator drift not apparent.     finger extension strength was strong bilat     Sensory:  RUE  intact light touch, proprioception, and temperature  LUE intact light touch, proprioception, and temperature    RLE intact light touch  LLE intact light touch      DTR's:                                            R              L  biceps 2+ 2+         brachioradialis 2+ 2+   Knee jerk 2+ 2+        Coordination:  FTN-WNL.       Gait and Stance: Normal manner of " stance and gait function testing. Romberg was negative.          This document has been electronically signed by Avril Tanvir DICKENSAvril Crenshaw MPA, PA-C on 5/17/2024, I have personally typed this message using the EMR.       Dr Jonathan MD was available during today's visit.     Personal Protective Equipment:    Personal Protective Equipment was used during this encounter including: non latex gloves.          CC: Rachele Kahn NP

## 2024-05-30 ENCOUNTER — PATIENT MESSAGE (OUTPATIENT)
Dept: NEUROLOGY | Facility: CLINIC | Age: 29
End: 2024-05-30
Payer: COMMERCIAL

## 2024-05-30 NOTE — TELEPHONE ENCOUNTER
Talked to patient, states she is taking 2 pills of elavil, notes she does feel tired during day but HA are better.     I suggested moving down to 1 pill of elavil at night and do this for a bit longer, and we can see how she feels.     She was appreciative of the phone call and had no other concerns.    BILLIE

## 2024-07-05 RX ORDER — AMITRIPTYLINE HYDROCHLORIDE 10 MG/1
TABLET, FILM COATED ORAL
Qty: 60 TABLET | Refills: 6 | Status: SHIPPED | OUTPATIENT
Start: 2024-07-05

## 2024-07-05 RX ORDER — RIZATRIPTAN BENZOATE 10 MG/1
TABLET, ORALLY DISINTEGRATING ORAL
Qty: 8 TABLET | Refills: 6 | Status: SHIPPED | OUTPATIENT
Start: 2024-07-05

## 2024-07-18 ENCOUNTER — PATIENT MESSAGE (OUTPATIENT)
Dept: NEUROLOGY | Facility: CLINIC | Age: 29
End: 2024-07-18

## 2024-07-18 ENCOUNTER — OFFICE VISIT (OUTPATIENT)
Dept: NEUROLOGY | Facility: CLINIC | Age: 29
End: 2024-07-18
Payer: COMMERCIAL

## 2024-07-18 DIAGNOSIS — G43.909 EPISODIC MIGRAINE: Primary | ICD-10-CM

## 2024-07-18 PROCEDURE — 1159F MED LIST DOCD IN RCRD: CPT | Mod: CPTII,95,, | Performed by: PHYSICIAN ASSISTANT

## 2024-07-18 PROCEDURE — 99213 OFFICE O/P EST LOW 20 MIN: CPT | Mod: 95,,, | Performed by: PHYSICIAN ASSISTANT

## 2024-07-18 PROCEDURE — 1160F RVW MEDS BY RX/DR IN RCRD: CPT | Mod: CPTII,95,, | Performed by: PHYSICIAN ASSISTANT

## 2024-07-18 NOTE — PROGRESS NOTES
[facility]  [facility]  [address] [facility] [address] [place] [address]  Phone:[phone]  Fax: [phone]   Follow up visit -telemed    Provider Location: Work         Name of Location: [facility]  City: [place]   State: [place]  Medium to connect: Video  Patient Location: work  Name of Location:   work                                   City: [facility]                                                              State: [place]                                         Consent for Electronic Treatment:   This visit was conducted with the use of an interactive audio and/or video telecommunications system that permits real-time communication between the patient and this provider. The patient has submitted their consent to be treated electronically by way of this telephone and/or video technology.  The risks and limitations of the process of telemedicine have been conveyed verbally during this encounter.Each patient to whom he or she provides medical services by telemedicine is:  (1) informed of the relationship between the physician and patient and the respective role of any other health care provider with respect to management of the patient; and (2) notified that he or she may decline to receive medical services by telemedicine and may withdraw from such care at any time.    Face to Face time with patient:   10 minutes of total time spent on the encounter, which includes face to face time and non-face to face time preparing to see the patient (eg, review of tests), Obtaining and/or reviewing separately obtained history, Documenting clinical information in the electronic or other health record, Independently interpreting results (not separately reported) and communicating results to the patient/family/caregiver, or Care coordination (not separately reported).       Patient Name: Rin Baum  : 1995  MRN:  [id number]  Today: 2024   LOV: 2024  chief complaint:  Headache    PCP: Rachele Kahn NP.       Assessment:   Rin Baum is a 29 y.o. handed, female with a PMHx of: HTN, anxiety and NEWTON  whom presents solo via telemed in follow up for HA.  Appears now to have episodic migraine (historically chronic). Current regimen is helping       Review:    ICD-10-CM ICD-9-CM   1. Episodic migraine  G43.909 346.90           Plan:               -if HA change in quality/nature, will get updated imaging study        For HA Prevention:  Continue elavil at 10 mg Q2h before bed        -if more fatigue, we can consider a pivot to pamelor     For HA :  Maxalt MLT    To break up Headaches:  If HA return, consider another course of deltasone     Other:  N/a           All test results as well as any necessary instructions were reviewed and discussed with patient.    Review:           Patient to return to PCP/other specialists for all other problems  Patient to continue on all medications as Rx'd  Full office note available online  RTO- 6 months to check in   The patient indicates understanding of these issues and agrees to the plan.    HPI:   Rin Baum is a 29 y.o.right handed, female with a PMHx of: HTN, anxiety and NEWTON  whom presents solo via telemed in follow up for HA.     At last visit: elavil, maxalt and prednisone written.   1 HD a week at most  Last HA was a week ago   Location:frontalis or temples  Tylenol was abortive most recently   Maxalt helps, no side effects   Elavil at 10 mg helping, no side effects  Steroids did help  No other concerns     HA Historically:   Start:HA a few years ago, then suddenly resolved, NEWTON returned last year (unknown trigger)   History of trauma (no), History of CNS infection (no), History of Stroke (no)  Location:frontalis or temples   Severity: range: 2-5/10  Duration:>6 hours   Frequency:16 HD/30      Associated factors (bolded positive) WITH HA ( or migraine): Nausea,  vomiting, photophobia, phonophobia, tinnitus, scalp pain, vision loss, diplopia, scintillations, eye pain, jaw pain, weakness?    Tried:OTC   Triggers (in bold): stress, lack of sleep, too much caffeine, too little caffeine, weather change, seasonal change, strong odours, bright lights, sunlight, food  <---unknown   Currently having a HA?:yes   Positives in bold: Hx of Kidney Stones, asthma, GI bleed, osteoporosis, CAD/MI, CVA/TIA, DM    Imaging on file: none   Therapies tried in past: (failures to be marked, if known---why did it fail?)  Toradol  Buspar  Ibuprofen   Elavil  Prednisone  Maxalt      When asked, has some trouble in sleep     I reviewed available notes before her visit     Medication Reconciliation:   Current Outpatient Medications   Medication Sig Dispense Refill    amitriptyline (ELAVIL) 10 MG tablet Take 2 pills approx 2 hours before bed every night 60 tablet 6    predniSONE (DELTASONE) 20 MG tablet On full stomach (breakfast): 3 tabs for 2 days, 2 tabs for 2 days, 1 tab for 2 days. Finish 12 tablet 0    rizatriptan (MAXALT-MLT) 10 MG disintegrating tablet 1 melt at onset headache, second melt 2 hours later if needed, no more than 2 melts day/3 days use in week 8 tablet 6     No current facility-administered medications for this visit.     Review of patient's allergies indicates:   Allergen Reactions    Nickel Hives    Buspar [buspirone] Nausea And Vomiting       PMHx:wisdom teeth   Past Medical History:   Diagnosis Date    Headache     Hypertension      No past surgical history on file.    Fhx:no HA in the family   No family history on file.    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)   Social History     Socioeconomic History    Marital status: Single   Tobacco Use    Smoking status: Never    Smokeless tobacco: Never   Substance and Sexual Activity    Alcohol use: Yes    Drug use: No    Sexual activity: Yes     Partners: Male     Birth control/protection: None     Social Determinants of Health      Physical Activity: Insufficiently Active (7/15/2024)    Exercise Vital Sign     Days of Exercise per Week: 2 days     Minutes of Exercise per Session: 30 min   Stress: Stress Concern Present (7/15/2024)    [employer] - Occupational Stress Questionnaire     Feeling of Stress : To some extent           Labs:   Reviewed in chart     Imaging:   Reviewed in chart       Other testing:  Reviewed in chart     Note: I have independently reviewed any/all imaging/labs/tests and agree with the report (s) as documented.  Any discrepancies will be as noted/demarcated by free text.  BILLIE WELCH 7/18/2024                     ROS:   Review Of Systems (questions asked, positive or additions in BOLD)  Gen: Weight change, fatigue/malaise, pyrexia   HEENT: Tinnitus, headache,  blurred vision, eye pain, diplopia, photophobia,  nose bleeds, congestion, sore throat, jaw pain, scalp pain, neck stiffness   Card: Palpitations, CP   Pulm: SOB, cough   Vas: Easy bruising, easy bleeding   GI: N/V/D/C, incontinence, hematemesis, hematochezia    : incontinence, hematuria   Endocrine: Temp intolerance, polyuria, polydipsia   M/S: Neck pain, myalgia, back pain, joint pain, falls    Neuro: PER HPI   PSY: Memory loss, confusion, depression, anxiety, trouble in sleep           EXAM:   There were no vitals taken for this visit. <---none taken as this was a virtual visit   GEN:  NAD  HEENT: NC/AT      NEURO:  Mental Status:  Awake, alert and appropriately oriented to time, place, and person.  Normal attention and concentration.  Speech is fluent and appropriate language function (I.e., comprehension).     Cranial Nerves:         Extraocular movements are intact and without nystagmus.    Facial movement is symmetric.  Hearing appears intact.  Uvula in midline. FROM of neck in all (6) directions, shoulder shrug symmetrical. Tongue in midline without fasiculation.     Motor:  antigravity bilat UE    No resting tremor        Gait and Stance: not tested    This document has been electronically signed by Mr. Tanvir WYATT Flower LEIVA PA-C on 7/18/2024, I have personally typed this message using the EMR.       Dr Jonathan MD was available during today's visit.            CC: Rachele Kahn, NP

## 2024-08-09 ENCOUNTER — PATIENT MESSAGE (OUTPATIENT)
Dept: PRIMARY CARE CLINIC | Facility: CLINIC | Age: 29
End: 2024-08-09

## 2024-08-22 ENCOUNTER — OFFICE VISIT (OUTPATIENT)
Dept: PRIMARY CARE CLINIC | Facility: CLINIC | Age: 29
End: 2024-08-22
Payer: COMMERCIAL

## 2024-08-22 VITALS
HEART RATE: 99 BPM | WEIGHT: 194.44 LBS | BODY MASS INDEX: 32.36 KG/M2 | TEMPERATURE: 98 F | DIASTOLIC BLOOD PRESSURE: 73 MMHG | SYSTOLIC BLOOD PRESSURE: 128 MMHG | RESPIRATION RATE: 18 BRPM | OXYGEN SATURATION: 100 %

## 2024-08-22 DIAGNOSIS — E66.9 OBESITY (BMI 30-39.9): Primary | ICD-10-CM

## 2024-08-22 PROCEDURE — 99214 OFFICE O/P EST MOD 30 MIN: CPT | Mod: S$GLB,,, | Performed by: PHYSICIAN ASSISTANT

## 2024-08-22 PROCEDURE — 3078F DIAST BP <80 MM HG: CPT | Mod: CPTII,S$GLB,, | Performed by: PHYSICIAN ASSISTANT

## 2024-08-22 PROCEDURE — 1159F MED LIST DOCD IN RCRD: CPT | Mod: CPTII,S$GLB,, | Performed by: PHYSICIAN ASSISTANT

## 2024-08-22 PROCEDURE — 3074F SYST BP LT 130 MM HG: CPT | Mod: CPTII,S$GLB,, | Performed by: PHYSICIAN ASSISTANT

## 2024-08-22 PROCEDURE — 3008F BODY MASS INDEX DOCD: CPT | Mod: CPTII,S$GLB,, | Performed by: PHYSICIAN ASSISTANT

## 2024-08-22 NOTE — PROGRESS NOTES
Subjective     Patient ID: Rin Baum is a 29 y.o. female.    Chief Complaint: Check up    Patient is a 28 yo female coming in today for weight loss therapy. She is eating a healthy diet, exercising. She has struggled with obtaining ideal weight for many years. Has tried ozempic in the past and has tolerated it well.     PCP: [facility].     Patient Active Problem List:     Migraine headaches    Past Medical History:  No date: Headache  No date: Hypertension    No past surgical history on file.    No family history on file.      Social History    Socioeconomic History      Marital status: Single    Tobacco Use      Smoking status: Never      Smokeless tobacco: Never    Substance and Sexual Activity      Alcohol use: Yes      Drug use: No      Sexual activity: Yes        Partners: Male        Birth control/protection: None    Social Determinants of Health  Physical Activity: Insufficiently Active (7/15/2024)      Exercise Vital Sign          Days of Exercise per Week: 2 days          Minutes of Exercise per Session: 30 min  Stress: Stress Concern Present (7/15/2024)      [employer] - Occupational Stress Questionnaire          Feeling of Stress : To some extent    Review of patient's allergies indicates:   -- Nickel -- Hives   -- Buspar [buspirone] -- Nausea And Vomiting    Current Outpatient Medications: ·  amitriptyline (ELAVIL) 10 MG tablet, Take 2 pills approx 2 hours before bed every night, Disp: 60 tablet, Rfl: 6·  rizatriptan (MAXALT-MLT) 10 MG disintegrating tablet, 1 melt at onset headache, second melt 2 hours later if needed, no more than 2 melts day/3 days use in week, Disp: 8 tablet, Rfl: 6·  tirzepatide 2.5 mg/0.5 mL PnIj, Inject 2.5 mg into the skin every 7 days., Disp: 4 Pen, Rfl: 1    /73   Pulse 99   Temp 98.2 °F (36.8 °C) (Temporal)   Resp 18   Wt 88.2 kg (194 lb 7.1 oz)   SpO2 100%   BMI 32.36 kg/m²         Review of Systems    Constitutional:  Negative for activity change, fatigue and fever.   Respiratory:  Negative for chest tightness and shortness of breath.    Cardiovascular:  Negative for chest pain and leg swelling.   Gastrointestinal:  Negative for abdominal pain, blood in stool, constipation, diarrhea and nausea.   Genitourinary: Negative.    Musculoskeletal:  Negative for arthralgias.   Neurological:  Negative for dizziness, numbness and headaches.   Psychiatric/Behavioral: Negative.            Objective     Physical Exam  Constitutional:       General: She is not in acute distress.     Appearance: Normal appearance. She is obese. She is not ill-appearing, toxic-appearing or diaphoretic.   HENT:      Head: Normocephalic and atraumatic.   Neck:      Vascular: No carotid bruit.   Cardiovascular:      Rate and Rhythm: Normal rate and regular rhythm.      Pulses: Normal pulses.      Heart sounds: Normal heart sounds. No murmur heard.     No friction rub. No gallop.   Pulmonary:      Effort: Pulmonary effort is normal. No respiratory distress.      Breath sounds: Normal breath sounds. No stridor. No wheezing, rhonchi or rales.   Chest:      Chest wall: No tenderness.   Abdominal:      Palpations: Abdomen is soft.      Tenderness: There is no abdominal tenderness.   Musculoskeletal:      Cervical back: No rigidity or tenderness.   Lymphadenopathy:      Cervical: No cervical adenopathy.   Skin:     General: Skin is warm and dry.   Neurological:      Mental Status: She is alert.   Psychiatric:         Mood and Affect: Mood normal.          Assessment and Plan     1. Obesity (BMI 30-39.9)  -     tirzepatide 2.5 mg/0.5 mL PnIj; Inject 2.5 mg into the skin every 7 days.  Dispense: 4 Pen; Refill: 1    Side effects discussed    Fu 1 mth    I spent 30 minutes on this encounter, time includes face-to-face, chart review, documentation, test review and orders.

## 2024-09-16 ENCOUNTER — OFFICE VISIT (OUTPATIENT)
Dept: OBSTETRICS AND GYNECOLOGY | Facility: CLINIC | Age: 29
End: 2024-09-16
Payer: COMMERCIAL

## 2024-09-16 ENCOUNTER — LAB VISIT (OUTPATIENT)
Dept: LAB | Facility: HOSPITAL | Age: 29
End: 2024-09-16
Attending: SPECIALIST
Payer: COMMERCIAL

## 2024-09-16 VITALS
WEIGHT: 199.31 LBS | SYSTOLIC BLOOD PRESSURE: 118 MMHG | BODY MASS INDEX: 33.21 KG/M2 | HEIGHT: 65 IN | DIASTOLIC BLOOD PRESSURE: 72 MMHG

## 2024-09-16 DIAGNOSIS — O14.90 PRE-ECLAMPSIA, ANTEPARTUM: ICD-10-CM

## 2024-09-16 DIAGNOSIS — Z3A.01 6 WEEKS GESTATION OF PREGNANCY: ICD-10-CM

## 2024-09-16 DIAGNOSIS — N91.0 DELAYED MENSES: Primary | ICD-10-CM

## 2024-09-16 DIAGNOSIS — Z11.3 SCREEN FOR STD (SEXUALLY TRANSMITTED DISEASE): ICD-10-CM

## 2024-09-16 DIAGNOSIS — Z12.4 ENCOUNTER FOR PAP SMEAR OF CERVIX WITH HPV DNA COTESTING: ICD-10-CM

## 2024-09-16 LAB
B-HCG UR QL: POSITIVE
BASOPHILS # BLD AUTO: 0.05 K/UL (ref 0–0.2)
BASOPHILS NFR BLD: 0.4 % (ref 0–1.9)
CTP QC/QA: YES
DIFFERENTIAL METHOD BLD: ABNORMAL
EOSINOPHIL # BLD AUTO: 0.1 K/UL (ref 0–0.5)
EOSINOPHIL NFR BLD: 0.7 % (ref 0–8)
ERYTHROCYTE [DISTWIDTH] IN BLOOD BY AUTOMATED COUNT: 14.1 % (ref 11.5–14.5)
HBV SURFACE AG SERPL QL IA: NORMAL
HCT VFR BLD AUTO: 35.2 % (ref 37–48.5)
HCV AB SERPL QL IA: NORMAL
HGB BLD-MCNC: 11.3 G/DL (ref 12–16)
HIV 1+2 AB+HIV1 P24 AG SERPL QL IA: NORMAL
IMM GRANULOCYTES # BLD AUTO: 0.05 K/UL (ref 0–0.04)
IMM GRANULOCYTES NFR BLD AUTO: 0.4 % (ref 0–0.5)
LYMPHOCYTES # BLD AUTO: 2.9 K/UL (ref 1–4.8)
LYMPHOCYTES NFR BLD: 21 % (ref 18–48)
MCH RBC QN AUTO: 26.8 PG (ref 27–31)
MCHC RBC AUTO-ENTMCNC: 32.1 G/DL (ref 32–36)
MCV RBC AUTO: 83 FL (ref 82–98)
MONOCYTES # BLD AUTO: 0.9 K/UL (ref 0.3–1)
MONOCYTES NFR BLD: 6.2 % (ref 4–15)
NEUTROPHILS # BLD AUTO: 9.9 K/UL (ref 1.8–7.7)
NEUTROPHILS NFR BLD: 71.3 % (ref 38–73)
NRBC BLD-RTO: 0 /100 WBC
PLATELET # BLD AUTO: 349 K/UL (ref 150–450)
PMV BLD AUTO: 10.5 FL (ref 9.2–12.9)
RBC # BLD AUTO: 4.22 M/UL (ref 4–5.4)
T4 FREE SERPL-MCNC: 0.96 NG/DL (ref 0.71–1.51)
TREPONEMA PALLIDUM IGG+IGM AB [PRESENCE] IN SERUM OR PLASMA BY IMMUNOASSAY: NONREACTIVE
TSH SERPL DL<=0.005 MIU/L-ACNC: 0.37 UIU/ML (ref 0.4–4)
WBC # BLD AUTO: 13.91 K/UL (ref 3.9–12.7)

## 2024-09-16 PROCEDURE — 84443 ASSAY THYROID STIM HORMONE: CPT | Performed by: SPECIALIST

## 2024-09-16 PROCEDURE — 84439 ASSAY OF FREE THYROXINE: CPT | Performed by: SPECIALIST

## 2024-09-16 PROCEDURE — 81220 CFTR GENE COM VARIANTS: CPT | Performed by: SPECIALIST

## 2024-09-16 PROCEDURE — 36415 COLL VENOUS BLD VENIPUNCTURE: CPT | Mod: PO | Performed by: SPECIALIST

## 2024-09-16 PROCEDURE — 86593 SYPHILIS TEST NON-TREP QUANT: CPT | Performed by: SPECIALIST

## 2024-09-16 PROCEDURE — 87340 HEPATITIS B SURFACE AG IA: CPT | Performed by: SPECIALIST

## 2024-09-16 PROCEDURE — 86901 BLOOD TYPING SEROLOGIC RH(D): CPT | Performed by: SPECIALIST

## 2024-09-16 PROCEDURE — 85025 COMPLETE CBC W/AUTO DIFF WBC: CPT | Performed by: SPECIALIST

## 2024-09-16 PROCEDURE — 87389 HIV-1 AG W/HIV-1&-2 AB AG IA: CPT | Performed by: SPECIALIST

## 2024-09-16 PROCEDURE — 86850 RBC ANTIBODY SCREEN: CPT | Performed by: SPECIALIST

## 2024-09-16 PROCEDURE — 86900 BLOOD TYPING SEROLOGIC ABO: CPT | Performed by: SPECIALIST

## 2024-09-16 PROCEDURE — 86762 RUBELLA ANTIBODY: CPT | Performed by: SPECIALIST

## 2024-09-16 PROCEDURE — 88175 CYTOPATH C/V AUTO FLUID REDO: CPT | Performed by: SPECIALIST

## 2024-09-16 PROCEDURE — 86803 HEPATITIS C AB TEST: CPT | Performed by: SPECIALIST

## 2024-09-16 PROCEDURE — 87591 N.GONORRHOEAE DNA AMP PROB: CPT | Performed by: SPECIALIST

## 2024-09-16 PROCEDURE — 87624 HPV HI-RISK TYP POOLED RSLT: CPT | Performed by: SPECIALIST

## 2024-09-16 PROCEDURE — 99999 PR PBB SHADOW E&M-EST. PATIENT-LVL III: CPT | Mod: PBBFAC,,, | Performed by: SPECIALIST

## 2024-09-16 PROCEDURE — 87491 CHLMYD TRACH DNA AMP PROBE: CPT | Performed by: SPECIALIST

## 2024-09-16 RX ORDER — METFORMIN HYDROCHLORIDE 500 MG/1
500 TABLET ORAL 2 TIMES DAILY
COMMUNITY
Start: 2024-09-04

## 2024-09-16 NOTE — PROGRESS NOTES
30 yo BF  ab1 presents for initial PNC LMP approx   Pos UPT  Significant history Pre E 1st 2 pregnancies 36 weeks  1st baby  SIDS  Nonsmoker, neg HTN history  Past Medical History:   Diagnosis Date    Headache     Hypertension        History reviewed. No pertinent surgical history.    Family History   Problem Relation Name Age of Onset    Breast cancer Neg Hx      Ovarian cancer Neg Hx         Social History     Socioeconomic History    Marital status: Single   Tobacco Use    Smoking status: Never    Smokeless tobacco: Never   Substance and Sexual Activity    Alcohol use: Not Currently    Drug use: No    Sexual activity: Yes     Partners: Male     Birth control/protection: None     Social Determinants of Health     Physical Activity: Insufficiently Active (7/15/2024)    Exercise Vital Sign     Days of Exercise per Week: 2 days     Minutes of Exercise per Session: 30 min   Stress: Stress Concern Present (7/15/2024)    [employer] - Occupational Stress Questionnaire     Feeling of Stress : To some extent       Current Outpatient Medications   Medication Sig Dispense Refill    metFORMIN (GLUCOPHAGE) 500 MG tablet Take 500 mg by mouth 2 (two) times daily.      amitriptyline (ELAVIL) 10 MG tablet Take 2 pills approx 2 hours before bed every night (Patient not taking: Reported on 2024) 60 tablet 6    rizatriptan (MAXALT-MLT) 10 MG disintegrating tablet 1 melt at onset headache, second melt 2 hours later if needed, no more than 2 melts day/3 days use in week (Patient not taking: Reported on 2024) 8 tablet 6     No current facility-administered medications for this visit.       Review of patient's allergies indicates:   Allergen Reactions    Nickel Hives    Buspar [buspirone] Nausea And Vomiting       Review of System:   General: no chills, fever, night sweats, weight gain or weight loss  Psychological: no depression or suicidal ideation  Breasts: no new or changing  breast lumps, nipple discharge or masses.  Respiratory: no cough, shortness of breath, or wheezing  Cardiovascular: no chest pain or dyspnea on exertion  Gastrointestinal: no abdominal pain, change in bowel habits, or black or bloody stools  Genito-Urinary: no incontinence, urinary frequency/urgency or vulvar/vaginal symptoms, pelvic pain or abnormal vaginal bleeding.  Musculoskeletal: no gait disturbance or muscular weakness                                               General Appearance    A and O x 4, Cooperative, no distress   Breasts    Abdomen   deferred  Soft, non-tender, bowel sounds active all four quadrants,  no masses, no organomegaly    Genitourinary:   External rectal exam shows no thrombosed external hemorrhoids.   Pelvic exam was performed with patient supine.  No labial fusion.  There is no rash, lesion or injury on the right labia.  There is no rash, lesion or injury on the left labia.  No bleeding and no signs of injury around the vaginal introitus, urethra is without lesions and well supported. The cervix is visualized with no discharge, lesions or friability.  No vaginal discharge found.  No significant Cystocele, Enterocele or rectocele, and uterus well supported.  Bimanual exam:  The urethra is normal to palpation and there are no palpable vaginal wall masses.  Uterus is not deviated, not enlarged, not fixed, normal shape and not tender.  Cervix exhibits no motion tenderness.   Right adnexum displays no mass and no tenderness.  Left adnexum displays no mass and no tenderness.   Extremities: Extremities normal, atraumatic, no cyanosis or edema                     NOTE  NURSING PERSONAL PRESENT FOR ENTIRE PHYSICAL EXAM     Procedure  TVS 6.5 Mhz probe  Positive IUP Single fetal pole and YS noted , viewed by pt CRL [id number] 5/8/25    Discussed PMX Pre e and thus rec baseline 24 hour urine collection  PNP  Begin BABY ASA 12 weeks  RTO 4 weeks    I spent a total of 30 minutes on the day of  the visit. This includes face to face time and non-face to face time preparing to see the patient (eg, review of tests), obtaining and/or reviewing separately obtained history, documenting clinical information in the electronic or other health record, independently interpreting results and communicating results to the patient/family/caregiver, or care coordinator.

## 2024-09-17 LAB
ABO + RH BLD: NORMAL
BLD GP AB SCN CELLS X3 SERPL QL: NORMAL
RUBV IGG SER-ACNC: 10.7 IU/ML
RUBV IGG SER-IMP: REACTIVE

## 2024-09-18 LAB
C TRACH DNA SPEC QL NAA+PROBE: NOT DETECTED
N GONORRHOEA DNA SPEC QL NAA+PROBE: NOT DETECTED

## 2024-09-20 LAB
CFTR MUT ANL BLD/T: NORMAL
FINAL PATHOLOGIC DIAGNOSIS: NORMAL
HPV HR 12 DNA SPEC QL NAA+PROBE: NEGATIVE
HPV16 AG SPEC QL: NEGATIVE
HPV18 DNA SPEC QL NAA+PROBE: NEGATIVE
Lab: NORMAL

## 2024-09-24 ENCOUNTER — PATIENT MESSAGE (OUTPATIENT)
Dept: OBSTETRICS AND GYNECOLOGY | Facility: CLINIC | Age: 29
End: 2024-09-24
Payer: COMMERCIAL

## 2024-09-26 ENCOUNTER — LAB VISIT (OUTPATIENT)
Dept: LAB | Facility: HOSPITAL | Age: 29
End: 2024-09-26
Attending: SPECIALIST
Payer: COMMERCIAL

## 2024-09-26 DIAGNOSIS — O14.90 PRE-ECLAMPSIA, ANTEPARTUM: ICD-10-CM

## 2024-09-26 PROCEDURE — 82570 ASSAY OF URINE CREATININE: CPT | Performed by: SPECIALIST

## 2024-09-27 LAB
CREAT 24H UR-MRATE: 57.3 MG/HR (ref 40–75)
CREAT UR-MCNC: 141 MG/DL (ref 15–325)
CREATININE, URINE (MG/SPEC): 1374.8 MG/SPEC
URINE COLLECTION DURATION: 24 HR
URINE VOLUME: 975 ML

## 2024-10-07 ENCOUNTER — PATIENT MESSAGE (OUTPATIENT)
Dept: OBSTETRICS AND GYNECOLOGY | Facility: CLINIC | Age: 29
End: 2024-10-07
Payer: COMMERCIAL

## 2024-10-07 RX ORDER — ONDANSETRON 4 MG/1
4 TABLET, ORALLY DISINTEGRATING ORAL EVERY 6 HOURS PRN
Qty: 20 TABLET | Refills: 1 | Status: SHIPPED | OUTPATIENT
Start: 2024-10-07

## 2024-10-14 ENCOUNTER — LAB VISIT (OUTPATIENT)
Dept: LAB | Facility: HOSPITAL | Age: 29
End: 2024-10-14
Attending: SPECIALIST
Payer: COMMERCIAL

## 2024-10-14 ENCOUNTER — ROUTINE PRENATAL (OUTPATIENT)
Dept: OBSTETRICS AND GYNECOLOGY | Facility: CLINIC | Age: 29
End: 2024-10-14
Payer: COMMERCIAL

## 2024-10-14 VITALS — SYSTOLIC BLOOD PRESSURE: 132 MMHG | BODY MASS INDEX: 32.8 KG/M2 | WEIGHT: 197.06 LBS | DIASTOLIC BLOOD PRESSURE: 70 MMHG

## 2024-10-14 DIAGNOSIS — Z13.79 GENETIC TESTING: ICD-10-CM

## 2024-10-14 DIAGNOSIS — Z3A.10 10 WEEKS GESTATION OF PREGNANCY: Primary | ICD-10-CM

## 2024-10-14 DIAGNOSIS — Z3A.10 10 WEEKS GESTATION OF PREGNANCY: ICD-10-CM

## 2024-10-14 LAB
BILIRUBIN, UA POC OHS: NEGATIVE
BLOOD, UA POC OHS: NEGATIVE
CLARITY, UA POC OHS: CLEAR
COLOR, UA POC OHS: YELLOW
GLUCOSE, UA POC OHS: NEGATIVE
KETONES, UA POC OHS: NEGATIVE
LEUKOCYTES, UA POC OHS: NEGATIVE
MISCELLANEOUS GENETIC TEST NAME: NORMAL
NITRITE, UA POC OHS: NEGATIVE
PH, UA POC OHS: 8.5
PROTEIN, UA POC OHS: 30
SPECIFIC GRAVITY, UA POC OHS: 1.02
UROBILINOGEN, UA POC OHS: 0.2

## 2024-10-14 PROCEDURE — 36415 COLL VENOUS BLD VENIPUNCTURE: CPT | Mod: PO | Performed by: SPECIALIST

## 2024-10-14 PROCEDURE — 99999 PR PBB SHADOW E&M-EST. PATIENT-LVL III: CPT | Mod: PBBFAC,,, | Performed by: SPECIALIST

## 2024-10-14 PROCEDURE — 0502F SUBSEQUENT PRENATAL CARE: CPT | Mod: CPTII,S$GLB,, | Performed by: SPECIALIST

## 2024-10-14 RX ORDER — ASPIRIN 81 MG/1
81 TABLET ORAL DAILY
Qty: 100 TABLET | Refills: 1 | Status: SHIPPED | OUTPATIENT
Start: 2024-10-14 | End: 2025-10-14

## 2024-10-14 NOTE — PROGRESS NOTES
Complaints today:occ Headache , No Bleeding or pains  Intermittent nausea    /70   Wt 89.4 kg (197 lb 1.5 oz)   LMP 2024 (Exact Date)   BMI 32.80 kg/m²     29 y.o., at 10w4d by Estimated Date of Delivery: 25  Patient Active Problem List   Diagnosis    Prenatal care, subsequent pregnancy in first trimester     OB History    Para Term  AB Living   4 2 2   1 2   SAB IAB Ectopic Multiple Live Births           2      # Outcome Date GA Lbr Aaron/2nd Weight Sex Type Anes PTL Lv   4 Current            3 Term 19 37w0d  2.778 kg (6 lb 2 oz) M  EPI  KELSEY      Complications: Preeclampsia   2 Term 17 37w0d  2.268 kg (5 lb) M  EPI  KELSEY      Complications: Preeclampsia   1 AB               Obstetric Comments   Patient reports pre-eclampsia at term with prior gestation       Dating reviewed    Allergies and problem list reviewed and updated    Medical and surgical history reviewed    Prenatal labs reviewed and updated    Physical Exam:  ABD: soft, gravid, nontender,     Assessment:  IUP 10w4d  Prior Pre e    Plan:   Discussed neg 24 hour urine  Rec BABY ASA daily on RTO  Discussed and offered CFDNA today   follow up 4 Weeks, bleeding/pain precautions

## 2024-10-23 ENCOUNTER — TELEPHONE (OUTPATIENT)
Dept: OBSTETRICS AND GYNECOLOGY | Facility: CLINIC | Age: 29
End: 2024-10-23

## 2024-10-23 ENCOUNTER — ROUTINE PRENATAL (OUTPATIENT)
Dept: OBSTETRICS AND GYNECOLOGY | Facility: CLINIC | Age: 29
End: 2024-10-23
Payer: COMMERCIAL

## 2024-10-23 VITALS
BODY MASS INDEX: 33.05 KG/M2 | SYSTOLIC BLOOD PRESSURE: 126 MMHG | DIASTOLIC BLOOD PRESSURE: 70 MMHG | WEIGHT: 198.63 LBS

## 2024-10-23 DIAGNOSIS — Z3A.11 11 WEEKS GESTATION OF PREGNANCY: Primary | ICD-10-CM

## 2024-10-23 LAB
BILIRUBIN, UA POC OHS: NEGATIVE
BLOOD, UA POC OHS: NEGATIVE
CLARITY, UA POC OHS: CLEAR
COLOR, UA POC OHS: COLORLESS
GLUCOSE, UA POC OHS: NEGATIVE
KETONES, UA POC OHS: NEGATIVE
LEUKOCYTES, UA POC OHS: ABNORMAL
NITRITE, UA POC OHS: NEGATIVE
PH, UA POC OHS: 7
PROTEIN, UA POC OHS: NEGATIVE
SPECIFIC GRAVITY, UA POC OHS: 1.01
UROBILINOGEN, UA POC OHS: 0.2

## 2024-10-23 PROCEDURE — 0502F SUBSEQUENT PRENATAL CARE: CPT | Mod: CPTII,S$GLB,, | Performed by: SPECIALIST

## 2024-10-23 PROCEDURE — 99999 PR PBB SHADOW E&M-EST. PATIENT-LVL III: CPT | Mod: PBBFAC,,, | Performed by: SPECIALIST

## 2024-10-23 NOTE — TELEPHONE ENCOUNTER
----- Message from Jaden Arana MD sent at 10/23/2024  4:21 PM CDT -----  Didn't receive Rhogam from ER visit   1st trimester bleed Needs T and Screen repeated on return visit

## 2024-10-23 NOTE — PROGRESS NOTES
Complaints today:Recent ER eval after episode silent spotting  has since abated. Denies heavy vag bleeding No cramping , No Bleeding or pains  Reviewed ER eval and U/S  Small anterior PAMELLA      /70   Wt 90.1 kg (198 lb 10.2 oz)   LMP 2024 (Exact Date)   BMI 33.05 kg/m²     29 y.o., at 11w6d by Estimated Date of Delivery: 25  Patient Active Problem List   Diagnosis    Prenatal care, subsequent pregnancy in first trimester     OB History    Para Term  AB Living   4 2 2   1 2   SAB IAB Ectopic Multiple Live Births           2      # Outcome Date GA Lbr Aaron/2nd Weight Sex Type Anes PTL Lv   4 Current            3 Term 19 37w0d  2.778 kg (6 lb 2 oz) M  EPI  KELSEY      Complications: Preeclampsia   2 Term 17 37w0d  2.268 kg (5 lb) M  EPI  KELSEY      Complications: Preeclampsia   1 AB               Obstetric Comments   Patient reports pre-eclampsia at term with prior gestation       Dating reviewed    Allergies and problem list reviewed and updated    Medical and surgical history reviewed    Prenatal labs reviewed and updated    Physical Exam:  ABD: soft, gravid, nontender,  Procedure Abd RTS 3.5Mhz head Viable negro CRL 11w3d  Normal JUD  Noted is anterior echolucent focus approx 1cm x .6cm suspicious for blood    Assessment:  IUP 11w6d  H/O Pre E  PAMELLA      Plan:   Discussed the above  Pelvic rest  Hold Baby ASA til 16 weeks  Did not receive Rhogam and I rec Ab screen to be repeated  follow up 3 weeks , bleeding/pain precautions

## 2024-11-12 ENCOUNTER — ROUTINE PRENATAL (OUTPATIENT)
Dept: OBSTETRICS AND GYNECOLOGY | Facility: CLINIC | Age: 29
End: 2024-11-12
Payer: COMMERCIAL

## 2024-11-12 VITALS
BODY MASS INDEX: 33.57 KG/M2 | DIASTOLIC BLOOD PRESSURE: 60 MMHG | SYSTOLIC BLOOD PRESSURE: 116 MMHG | WEIGHT: 201.75 LBS

## 2024-11-12 DIAGNOSIS — R82.90 ABNORMAL URINE FINDING: ICD-10-CM

## 2024-11-12 DIAGNOSIS — Z3A.14 14 WEEKS GESTATION OF PREGNANCY: Primary | ICD-10-CM

## 2024-11-12 LAB
BILIRUBIN, UA POC OHS: NEGATIVE
BLOOD, UA POC OHS: ABNORMAL
CLARITY, UA POC OHS: CLEAR
COLOR, UA POC OHS: YELLOW
GLUCOSE, UA POC OHS: NEGATIVE
KETONES, UA POC OHS: NEGATIVE
LEUKOCYTES, UA POC OHS: ABNORMAL
NITRITE, UA POC OHS: NEGATIVE
PH, UA POC OHS: 7
PROTEIN, UA POC OHS: NEGATIVE
SPECIFIC GRAVITY, UA POC OHS: 1.01
UROBILINOGEN, UA POC OHS: 0.2

## 2024-11-12 PROCEDURE — 87086 URINE CULTURE/COLONY COUNT: CPT | Performed by: SPECIALIST

## 2024-11-12 PROCEDURE — 0502F SUBSEQUENT PRENATAL CARE: CPT | Mod: CPTII,S$GLB,, | Performed by: SPECIALIST

## 2024-11-12 PROCEDURE — 81003 URINALYSIS AUTO W/O SCOPE: CPT | Mod: QW,S$GLB,, | Performed by: SPECIALIST

## 2024-11-12 PROCEDURE — 99999 PR PBB SHADOW E&M-EST. PATIENT-LVL III: CPT | Mod: PBBFAC,,, | Performed by: SPECIALIST

## 2024-11-12 NOTE — PROGRESS NOTES
Complaints today:none , No Bleeding or pains    /60   Wt 91.5 kg (201 lb 11.5 oz)   LMP 2024 (Exact Date)   BMI 33.57 kg/m²     29 y.o., at 14w5d by Estimated Date of Delivery: 25  Patient Active Problem List   Diagnosis    Prenatal care, subsequent pregnancy in first trimester     OB History    Para Term  AB Living   4 2 2   1 2   SAB IAB Ectopic Multiple Live Births           2      # Outcome Date GA Lbr Aaron/2nd Weight Sex Type Anes PTL Lv   4 Current            3 Term 19 37w0d  2.778 kg (6 lb 2 oz) M  EPI  KELSEY      Complications: Preeclampsia   2 Term 17 37w0d  2.268 kg (5 lb) M  EPI  KELSEY      Complications: Preeclampsia   1 AB               Obstetric Comments   Patient reports pre-eclampsia at term with prior gestation       Dating reviewed    Allergies and problem list reviewed and updated    Medical and surgical history reviewed    Prenatal labs reviewed and updated    Physical Exam:  ABD: soft, gravid, nontender,     Assessment:  IUP 14w5d  Previous Pre E    Plan:   Anatomy u/s on RTO  follow up 4 Weeks, bleeding/pain precautions   kick counts, labor precautions

## 2024-11-13 ENCOUNTER — PATIENT MESSAGE (OUTPATIENT)
Dept: OBSTETRICS AND GYNECOLOGY | Facility: CLINIC | Age: 29
End: 2024-11-13
Payer: COMMERCIAL

## 2024-11-13 LAB
BACTERIA UR CULT: NORMAL
BACTERIA UR CULT: NORMAL

## 2024-11-21 ENCOUNTER — PATIENT MESSAGE (OUTPATIENT)
Dept: OTHER | Facility: OTHER | Age: 29
End: 2024-11-21
Payer: COMMERCIAL

## 2024-11-28 ENCOUNTER — PATIENT MESSAGE (OUTPATIENT)
Dept: OTHER | Facility: OTHER | Age: 29
End: 2024-11-28
Payer: COMMERCIAL

## 2024-12-10 ENCOUNTER — HOSPITAL ENCOUNTER (OUTPATIENT)
Dept: RADIOLOGY | Facility: HOSPITAL | Age: 29
Discharge: HOME | End: 2024-12-10
Attending: SPECIALIST
Payer: COMMERCIAL

## 2024-12-10 ENCOUNTER — ROUTINE PRENATAL (OUTPATIENT)
Dept: OBSTETRICS AND GYNECOLOGY | Facility: CLINIC | Age: 29
End: 2024-12-10
Payer: COMMERCIAL

## 2024-12-10 VITALS — BODY MASS INDEX: 33.86 KG/M2 | SYSTOLIC BLOOD PRESSURE: 118 MMHG | WEIGHT: 203.5 LBS | DIASTOLIC BLOOD PRESSURE: 62 MMHG

## 2024-12-10 DIAGNOSIS — Z3A.14 14 WEEKS GESTATION OF PREGNANCY: ICD-10-CM

## 2024-12-10 DIAGNOSIS — Z3A.18 18 WEEKS GESTATION OF PREGNANCY: Primary | ICD-10-CM

## 2024-12-10 PROCEDURE — 76805 OB US >/= 14 WKS SNGL FETUS: CPT | Mod: TC,PN

## 2024-12-10 PROCEDURE — 0502F SUBSEQUENT PRENATAL CARE: CPT | Mod: CPTII,S$GLB,, | Performed by: SPECIALIST

## 2024-12-10 PROCEDURE — 99999 PR PBB SHADOW E&M-EST. PATIENT-LVL II: CPT | Mod: PBBFAC,,, | Performed by: SPECIALIST

## 2024-12-10 NOTE — PROGRESS NOTES
Complaints today:Completing anatomy u/s today , Good fetal movements reported No Bleeding or pains    /62   Wt 92.3 kg (203 lb 7.8 oz)   LMP 2024 (Exact Date)   BMI 33.86 kg/m²     29 y.o., at 18w5d by Estimated Date of Delivery: 25  Patient Active Problem List   Diagnosis    Prenatal care, subsequent pregnancy in first trimester     OB History    Para Term  AB Living   4 2 2   1 2   SAB IAB Ectopic Multiple Live Births           2      # Outcome Date GA Lbr Aaron/2nd Weight Sex Type Anes PTL Lv   4 Current            3 Term 19 37w0d  2.778 kg (6 lb 2 oz) M  EPI  KELSEY      Complications: Preeclampsia   2 Term 17 37w0d  2.268 kg (5 lb) M  EPI  KELSEY      Complications: Preeclampsia   1 AB               Obstetric Comments   Patient reports pre-eclampsia at term with prior gestation       Dating reviewed    Allergies and problem list reviewed and updated    Medical and surgical history reviewed    Prenatal labs reviewed and updated    Physical Exam:  ABD: soft, gravid, nontender,     Assessment:  IUP 18w5d  Previous Pre e    Plan:   Review u/s  GD screen on RTO  follow up 4 Weeks, bleeding/pain precautions

## 2024-12-19 ENCOUNTER — PATIENT MESSAGE (OUTPATIENT)
Dept: OTHER | Facility: OTHER | Age: 29
End: 2024-12-19
Payer: COMMERCIAL

## 2025-01-07 ENCOUNTER — ROUTINE PRENATAL (OUTPATIENT)
Dept: OBSTETRICS AND GYNECOLOGY | Facility: CLINIC | Age: 30
End: 2025-01-07
Payer: COMMERCIAL

## 2025-01-07 ENCOUNTER — LAB VISIT (OUTPATIENT)
Dept: LAB | Facility: HOSPITAL | Age: 30
End: 2025-01-07
Attending: SPECIALIST
Payer: COMMERCIAL

## 2025-01-07 VITALS — DIASTOLIC BLOOD PRESSURE: 66 MMHG | WEIGHT: 205.5 LBS | BODY MASS INDEX: 34.19 KG/M2 | SYSTOLIC BLOOD PRESSURE: 122 MMHG

## 2025-01-07 DIAGNOSIS — Z3A.22 22 WEEKS GESTATION OF PREGNANCY: Primary | ICD-10-CM

## 2025-01-07 DIAGNOSIS — Z3A.18 18 WEEKS GESTATION OF PREGNANCY: ICD-10-CM

## 2025-01-07 DIAGNOSIS — Z87.59 HISTORY OF PRE-ECLAMPSIA: ICD-10-CM

## 2025-01-07 LAB
BILIRUBIN, UA POC OHS: NEGATIVE
BLOOD, UA POC OHS: NEGATIVE
CLARITY, UA POC OHS: CLEAR
COLOR, UA POC OHS: YELLOW
GLUCOSE SERPL-MCNC: 125 MG/DL (ref 70–140)
GLUCOSE, UA POC OHS: NEGATIVE
KETONES, UA POC OHS: NEGATIVE
LEUKOCYTES, UA POC OHS: ABNORMAL
NITRITE, UA POC OHS: NEGATIVE
PH, UA POC OHS: 7
PROTEIN, UA POC OHS: NEGATIVE
SPECIFIC GRAVITY, UA POC OHS: 1.02
UROBILINOGEN, UA POC OHS: 0.2

## 2025-01-07 PROCEDURE — 99999 PR PBB SHADOW E&M-EST. PATIENT-LVL III: CPT | Mod: PBBFAC,,, | Performed by: SPECIALIST

## 2025-01-07 PROCEDURE — 0502F SUBSEQUENT PRENATAL CARE: CPT | Mod: CPTII,S$GLB,, | Performed by: SPECIALIST

## 2025-01-07 PROCEDURE — 82950 GLUCOSE TEST: CPT | Performed by: SPECIALIST

## 2025-01-07 PROCEDURE — 36415 COLL VENOUS BLD VENIPUNCTURE: CPT | Mod: PN | Performed by: SPECIALIST

## 2025-01-07 NOTE — PROGRESS NOTES
Complaints today:none  Completing Gd screen today, Good fetal movements reported No Bleeding or pains    /66   Wt 93.2 kg (205 lb 7.5 oz)   LMP 2024 (Exact Date)   BMI 34.19 kg/m²     29 y.o., at 22w5d by Estimated Date of Delivery: 25  Patient Active Problem List   Diagnosis    Prenatal care, subsequent pregnancy in first trimester     OB History    Para Term  AB Living   4 2 2   1 2   SAB IAB Ectopic Multiple Live Births           2      # Outcome Date GA Lbr Aaron/2nd Weight Sex Type Anes PTL Lv   4 Current            3 Term 19 37w0d  2.778 kg (6 lb 2 oz) M  EPI  KELSEY      Complications: Preeclampsia   2 Term 17 37w0d  2.268 kg (5 lb) M  EPI  KELSEY      Complications: Preeclampsia   1 AB               Obstetric Comments   Patient reports pre-eclampsia at term with prior gestation       Dating reviewed    Allergies and problem list reviewed and updated    Medical and surgical history reviewed    Prenatal labs reviewed and updated    Physical Exam:  ABD: soft, gravid, nontender,     Assessment:  IUP 22 weeks   History PreE    Plan:   GD screen  Continue Baby ASA  MFM consult  follow up 4 Weeks, bleeding/pain precautions   kick counts, labor precautions

## 2025-01-08 DIAGNOSIS — O09.292 HISTORY OF PRE-ECLAMPSIA IN PRIOR PREGNANCY, CURRENTLY PREGNANT IN SECOND TRIMESTER: Primary | ICD-10-CM

## 2025-01-10 ENCOUNTER — PROCEDURE VISIT (OUTPATIENT)
Dept: MATERNAL FETAL MEDICINE | Facility: CLINIC | Age: 30
End: 2025-01-10
Payer: COMMERCIAL

## 2025-01-10 ENCOUNTER — PATIENT MESSAGE (OUTPATIENT)
Dept: OBSTETRICS AND GYNECOLOGY | Facility: CLINIC | Age: 30
End: 2025-01-10
Payer: COMMERCIAL

## 2025-01-10 VITALS
HEART RATE: 105 BPM | BODY MASS INDEX: 34.16 KG/M2 | SYSTOLIC BLOOD PRESSURE: 134 MMHG | HEIGHT: 65 IN | WEIGHT: 205 LBS | DIASTOLIC BLOOD PRESSURE: 77 MMHG

## 2025-01-10 DIAGNOSIS — O09.292 HISTORY OF PRE-ECLAMPSIA IN PRIOR PREGNANCY, CURRENTLY PREGNANT IN SECOND TRIMESTER: ICD-10-CM

## 2025-01-10 PROCEDURE — 76805 OB US >/= 14 WKS SNGL FETUS: CPT | Mod: S$GLB,,, | Performed by: OBSTETRICS & GYNECOLOGY

## 2025-01-13 ENCOUNTER — OFFICE VISIT (OUTPATIENT)
Dept: MATERNAL FETAL MEDICINE | Facility: CLINIC | Age: 30
End: 2025-01-13
Payer: COMMERCIAL

## 2025-01-13 DIAGNOSIS — Z3A.22 22 WEEKS GESTATION OF PREGNANCY: ICD-10-CM

## 2025-01-13 DIAGNOSIS — O09.292 HX OF PREECLAMPSIA, PRIOR PREGNANCY, CURRENTLY PREGNANT, SECOND TRIMESTER: Primary | ICD-10-CM

## 2025-01-13 DIAGNOSIS — Z87.59 HISTORY OF PRE-ECLAMPSIA: ICD-10-CM

## 2025-01-13 NOTE — ASSESSMENT & PLAN NOTE
Prior preeclampsia  I reviewed the patient's obstetric history which is remarkable for development of preeclampsia at 37 weeks during her first pregnancy and then diagnosed prior to 37 weeks in her second pregnancy with close outpatient surveillance however subsequent delivery at 37 weeks gestation due to severe features.  We reviewed the risk of recurrence in subsequent pregnancies. Subsequent pregnancies in women with severe preeclampsia are at risk of other  complications including abruption,  birth, FGR, and increased  mortality. The patient's blood pressure normalized following delivery.We reviewed the role of low dose aspirin therapy in regards to preeclampsia risk reduction in subsequent pregnancies.      She reports no history of CHTN  Recommendations:  Continue aspirin 81 mg daily for preeclampsia risk reduction  Obtain baseline preeclampsia studies: (CMP, CBC, 24 hour urine protein or urine protein/creatinine ratio)  Close surveillance for signs/symptoms of preeclampsia in second/third trimester and postpartum period

## 2025-01-13 NOTE — PROGRESS NOTES
The patient location is: Work  The chief complaint leading to consultation is: history of preeclampsia    Visit type: audiovisual    Face to Face time with patient: 10  20 minutes of total time spent on the encounter, which includes face to face time and non-face to face time preparing to see the patient (eg, review of tests), Obtaining and/or reviewing separately obtained history, Documenting clinical information in the electronic or other health record, Independently interpreting results (not separately reported) and communicating results to the patient/family/caregiver, or Care coordination (not separately reported).         Each patient to whom he or she provides medical services by telemedicine is:  (1) informed of the relationship between the physician and patient and the respective role of any other health care provider with respect to management of the patient; and (2) notified that he or she may decline to receive medical services by telemedicine and may withdraw from such care at any time.    Notes:   MATERNAL-FETAL MEDICINE   CONSULT NOTE    Provider requesting consultation: Dr. Arana  SUBJECTIVE:   Ms. Rin Baum is a 29 y.o.  female with IUP at 23w4d who is seen in consultation by MFM for evaluation and management of:  Problem   Hx of Preeclampsia, Prior Pregnancy, Currently Pregnant, Second Trimester        Medication List with Changes/Refills   Current Medications    ASPIRIN (ECOTRIN) 81 MG EC TABLET    Take 1 tablet (81 mg total) by mouth once daily.     Review of patient's allergies indicates:   Allergen Reactions    Nickel Hives    Buspar [buspirone] Nausea And Vomiting     OB History    Para Term  AB Living   4 2 2   1 2   SAB IAB Ectopic Multiple Live Births           2      # Outcome Date GA Lbr Aaron/2nd Weight Sex Type Anes PTL Lv   4 Current            3 Term 19 37w0d  2.778 kg (6 lb 2 oz) M  EPI  KELSEY      Complications: Preeclampsia   2 Term 17 37w0d   2.268 kg (5 lb) M  EPI  KELSEY      Complications: Preeclampsia   1 AB               Obstetric Comments   Patient reports pre-eclampsia at term with prior gestation     Past Medical History:   Diagnosis Date    denies           No past surgical history on file.  Family history: negative for birth defects, recurrent miscarriages, chromosomal abnormalities.   Social History     Tobacco Use    Smoking status: Never    Smokeless tobacco: Never   Substance Use Topics    Alcohol use: Not Currently    Drug use: No     Review of patient's allergies indicates:   Allergen Reactions    Nickel Hives    Buspar [buspirone] Nausea And Vomiting       ASSESSMENT/PLAN:     29 y.o.  female with IUP at 23w4d     Hx of preeclampsia, prior pregnancy, currently pregnant, second trimester  Prior preeclampsia  I reviewed the patient's obstetric history which is remarkable for development of preeclampsia at 37 weeks during her first pregnancy and then diagnosed prior to 37 weeks in her second pregnancy with close outpatient surveillance however subsequent delivery at 37 weeks gestation due to severe features.  We reviewed the risk of recurrence in subsequent pregnancies. Subsequent pregnancies in women with severe preeclampsia are at risk of other  complications including abruption,  birth, FGR, and increased  mortality. The patient's blood pressure normalized following delivery.We reviewed the role of low dose aspirin therapy in regards to preeclampsia risk reduction in subsequent pregnancies.      She reports no history of CHTN  Recommendations:  Continue aspirin 81 mg daily for preeclampsia risk reduction  Obtain baseline preeclampsia studies: (CMP, CBC, 24 hour urine protein or urine protein/creatinine ratio)  Close surveillance for signs/symptoms of preeclampsia in second/third trimester and postpartum period      FOLLOW UP:   Follow up anatomy in 4 weeks has been scheduled      Parveen  Sincere  Maternal-Fetal Medicine    Electronically Signed by Parveen Post January 13, 2025

## 2025-01-16 ENCOUNTER — PATIENT MESSAGE (OUTPATIENT)
Dept: OTHER | Facility: OTHER | Age: 30
End: 2025-01-16
Payer: COMMERCIAL

## 2025-01-24 ENCOUNTER — PATIENT MESSAGE (OUTPATIENT)
Dept: OBSTETRICS AND GYNECOLOGY | Facility: CLINIC | Age: 30
End: 2025-01-24
Payer: COMMERCIAL

## 2025-01-30 ENCOUNTER — PATIENT MESSAGE (OUTPATIENT)
Dept: OTHER | Facility: OTHER | Age: 30
End: 2025-01-30
Payer: COMMERCIAL

## 2025-02-04 ENCOUNTER — ROUTINE PRENATAL (OUTPATIENT)
Dept: OBSTETRICS AND GYNECOLOGY | Facility: CLINIC | Age: 30
End: 2025-02-04
Payer: COMMERCIAL

## 2025-02-04 VITALS
DIASTOLIC BLOOD PRESSURE: 68 MMHG | BODY MASS INDEX: 35.07 KG/M2 | SYSTOLIC BLOOD PRESSURE: 126 MMHG | WEIGHT: 210.75 LBS

## 2025-02-04 DIAGNOSIS — Z3A.26 26 WEEKS GESTATION OF PREGNANCY: Primary | ICD-10-CM

## 2025-02-04 LAB
BILIRUBIN, UA POC OHS: NEGATIVE
BLOOD, UA POC OHS: NEGATIVE
CLARITY, UA POC OHS: CLEAR
COLOR, UA POC OHS: YELLOW
GLUCOSE, UA POC OHS: NEGATIVE
KETONES, UA POC OHS: NEGATIVE
LEUKOCYTES, UA POC OHS: ABNORMAL
NITRITE, UA POC OHS: NEGATIVE
PH, UA POC OHS: 7
PROTEIN, UA POC OHS: NEGATIVE
SPECIFIC GRAVITY, UA POC OHS: 1.01
UROBILINOGEN, UA POC OHS: 0.2

## 2025-02-04 PROCEDURE — 99999 PR PBB SHADOW E&M-EST. PATIENT-LVL II: CPT | Mod: PBBFAC,,, | Performed by: SPECIALIST

## 2025-02-04 PROCEDURE — 0502F SUBSEQUENT PRENATAL CARE: CPT | Mod: CPTII,S$GLB,, | Performed by: SPECIALIST

## 2025-02-04 NOTE — PROGRESS NOTES
Complaints today:none, Good fetal movements reported No Bleeding or pains    /68   Wt 95.6 kg (210 lb 12.2 oz)   LMP 2024 (Exact Date)   BMI 35.07 kg/m²     29 y.o., at 26w5d by Estimated Date of Delivery: 25  Patient Active Problem List   Diagnosis    Prenatal care, subsequent pregnancy in first trimester    Hx of preeclampsia, prior pregnancy, currently pregnant, second trimester     OB History    Para Term  AB Living   4 2 2   1 2   SAB IAB Ectopic Multiple Live Births           2      # Outcome Date GA Lbr Aaron/2nd Weight Sex Type Anes PTL Lv   4 Current            3 Term 19 37w0d  2.778 kg (6 lb 2 oz) M  EPI  KELSEY      Complications: Preeclampsia   2 Term 17 37w0d  2.268 kg (5 lb) M  EPI  KELSEY      Complications: Preeclampsia   1 AB               Obstetric Comments   Patient reports pre-eclampsia at term with prior gestation       Dating reviewed    Allergies and problem list reviewed and updated    Medical and surgical history reviewed    Prenatal labs reviewed and updated    Physical Exam:  ABD: soft, gravid, nontender,     Assessment:  IUP 26w5d    Plan:   Rhogam om RTO  follow up 2 Weeks, bleeding/pain precautions   kick counts, labor precautions

## 2025-02-13 ENCOUNTER — PATIENT MESSAGE (OUTPATIENT)
Dept: OTHER | Facility: OTHER | Age: 30
End: 2025-02-13
Payer: COMMERCIAL

## 2025-02-14 ENCOUNTER — PROCEDURE VISIT (OUTPATIENT)
Dept: MATERNAL FETAL MEDICINE | Facility: CLINIC | Age: 30
End: 2025-02-14
Payer: COMMERCIAL

## 2025-02-14 VITALS — DIASTOLIC BLOOD PRESSURE: 60 MMHG | SYSTOLIC BLOOD PRESSURE: 124 MMHG | HEART RATE: 101 BPM

## 2025-02-14 DIAGNOSIS — O09.293 HISTORY OF PRE-ECLAMPSIA IN PRIOR PREGNANCY, CURRENTLY PREGNANT IN THIRD TRIMESTER: ICD-10-CM

## 2025-02-18 ENCOUNTER — ROUTINE PRENATAL (OUTPATIENT)
Dept: OBSTETRICS AND GYNECOLOGY | Facility: CLINIC | Age: 30
End: 2025-02-18
Payer: COMMERCIAL

## 2025-02-18 VITALS
BODY MASS INDEX: 35.44 KG/M2 | SYSTOLIC BLOOD PRESSURE: 126 MMHG | WEIGHT: 212.94 LBS | DIASTOLIC BLOOD PRESSURE: 74 MMHG

## 2025-02-18 DIAGNOSIS — Z3A.28 28 WEEKS GESTATION OF PREGNANCY: Primary | ICD-10-CM

## 2025-02-18 LAB
BILIRUBIN, UA POC OHS: NEGATIVE
BLOOD, UA POC OHS: NEGATIVE
CLARITY, UA POC OHS: CLEAR
COLOR, UA POC OHS: YELLOW
GLUCOSE, UA POC OHS: NEGATIVE
KETONES, UA POC OHS: NEGATIVE
LEUKOCYTES, UA POC OHS: NEGATIVE
NITRITE, UA POC OHS: NEGATIVE
PH, UA POC OHS: 7.5
PROTEIN, UA POC OHS: NEGATIVE
SPECIFIC GRAVITY, UA POC OHS: 1.01
UROBILINOGEN, UA POC OHS: 0.2

## 2025-02-18 NOTE — PROGRESS NOTES
Complaints today:none  Pt denies CP SOB headache, Edema,Good fetal movements reported No Bleeding or pains    Appropriate fetal growth with baby measuring 45%    /74   Wt 96.6 kg (212 lb 15.4 oz)   LMP 2024 (Exact Date)   BMI 35.44 kg/m²     29 y.o., at 28w5d by Estimated Date of Delivery: 25  Problem List[1]  OB History    Para Term  AB Living   4 2 2  1 2   SAB IAB Ectopic Multiple Live Births       2      # Outcome Date GA Lbr Aaron/2nd Weight Sex Type Anes PTL Lv   4 Current            3 Term 19 37w0d  2.778 kg (6 lb 2 oz) M  EPI  KELSEY      Complications: Preeclampsia   2 Term 17 37w0d  2.268 kg (5 lb) M  EPI  KELSEY      Complications: Preeclampsia   1 AB               Obstetric Comments   Patient reports pre-eclampsia at term with prior gestation       Dating reviewed    Allergies and problem list reviewed and updated    Medical and surgical history reviewed    Prenatal labs reviewed and updated    Physical Exam:  ABD: soft, gravid, nontender,     Assessment:  IUP 28 weeks  History Pre E    Plan:   Rhogam  PreE precautions  Plan delivery 36-37 weeks  follow up 2 Weeks, bleeding/pain precautions   kick counts, labor precautions                 [1]   Patient Active Problem List  Diagnosis    Prenatal care, subsequent pregnancy in first trimester    Hx of preeclampsia, prior pregnancy, currently pregnant, second trimester

## 2025-02-21 ENCOUNTER — PATIENT MESSAGE (OUTPATIENT)
Dept: OBSTETRICS AND GYNECOLOGY | Facility: CLINIC | Age: 30
End: 2025-02-21
Payer: COMMERCIAL

## 2025-02-27 ENCOUNTER — PATIENT MESSAGE (OUTPATIENT)
Dept: OTHER | Facility: OTHER | Age: 30
End: 2025-02-27
Payer: COMMERCIAL

## 2025-03-05 ENCOUNTER — ROUTINE PRENATAL (OUTPATIENT)
Dept: OBSTETRICS AND GYNECOLOGY | Facility: CLINIC | Age: 30
End: 2025-03-05
Payer: COMMERCIAL

## 2025-03-05 VITALS
SYSTOLIC BLOOD PRESSURE: 106 MMHG | DIASTOLIC BLOOD PRESSURE: 68 MMHG | BODY MASS INDEX: 36.71 KG/M2 | WEIGHT: 213.88 LBS

## 2025-03-05 DIAGNOSIS — Z36.89 ENCOUNTER FOR ULTRASOUND TO CHECK FETAL GROWTH: ICD-10-CM

## 2025-03-05 DIAGNOSIS — Z3A.30 30 WEEKS GESTATION OF PREGNANCY: Primary | ICD-10-CM

## 2025-03-05 LAB
BILIRUBIN, UA POC OHS: NEGATIVE
BLOOD, UA POC OHS: NEGATIVE
CLARITY, UA POC OHS: CLEAR
COLOR, UA POC OHS: YELLOW
GLUCOSE, UA POC OHS: NEGATIVE
KETONES, UA POC OHS: NEGATIVE
LEUKOCYTES, UA POC OHS: ABNORMAL
NITRITE, UA POC OHS: NEGATIVE
PH, UA POC OHS: 6
PROTEIN, UA POC OHS: NEGATIVE
SPECIFIC GRAVITY, UA POC OHS: 1.02
UROBILINOGEN, UA POC OHS: 0.2

## 2025-03-05 PROCEDURE — 99999 PR PBB SHADOW E&M-EST. PATIENT-LVL III: CPT | Mod: PBBFAC,,, | Performed by: SPECIALIST

## 2025-03-05 PROCEDURE — 0502F SUBSEQUENT PRENATAL CARE: CPT | Mod: CPTII,S$GLB,, | Performed by: SPECIALIST

## 2025-03-05 NOTE — PROGRESS NOTES
Complaints today:none , Good fetal movements reported No Bleeding or pains    /68   Wt 97 kg (213 lb 13.5 oz)   LMP 2024 (Exact Date)   BMI 36.71 kg/m²     29 y.o., at 30w6d by Estimated Date of Delivery: 25  Problem List[1]  OB History    Para Term  AB Living   4 2 2  1 2   SAB IAB Ectopic Multiple Live Births       2      # Outcome Date GA Lbr Aaron/2nd Weight Sex Type Anes PTL Lv   4 Current            3 Term 19 37w0d  2.778 kg (6 lb 2 oz) M  EPI  KELSEY      Complications: Preeclampsia   2 Term 17 37w0d  2.268 kg (5 lb) M  EPI  KELSEY      Complications: Preeclampsia   1 AB               Obstetric Comments   Patient reports pre-eclampsia at term with prior gestation       Dating reviewed    Allergies and problem list reviewed and updated    Medical and surgical history reviewed    Prenatal labs reviewed and updated    Physical Exam:  ABD: soft, gravid, nontender,     Assessment:  IUP 30w6d  Previous Pre E    Plan:   Pre e precautions discussed  Repeat u/s 1 week for growth and JUD  follow up 1 Weeks, bleeding/pain precautions   kick counts, labor precautions                 [1]   Patient Active Problem List  Diagnosis    Prenatal care, subsequent pregnancy in first trimester    Hx of preeclampsia, prior pregnancy, currently pregnant, second trimester

## 2025-03-13 ENCOUNTER — HOSPITAL ENCOUNTER (OUTPATIENT)
Dept: RADIOLOGY | Facility: HOSPITAL | Age: 30
Discharge: HOME | End: 2025-03-13
Attending: SPECIALIST
Payer: COMMERCIAL

## 2025-03-13 ENCOUNTER — PATIENT MESSAGE (OUTPATIENT)
Dept: OTHER | Facility: OTHER | Age: 30
End: 2025-03-13
Payer: COMMERCIAL

## 2025-03-13 ENCOUNTER — ROUTINE PRENATAL (OUTPATIENT)
Dept: OBSTETRICS AND GYNECOLOGY | Facility: CLINIC | Age: 30
End: 2025-03-13
Payer: COMMERCIAL

## 2025-03-13 VITALS
SYSTOLIC BLOOD PRESSURE: 118 MMHG | DIASTOLIC BLOOD PRESSURE: 70 MMHG | WEIGHT: 214.06 LBS | BODY MASS INDEX: 36.74 KG/M2

## 2025-03-13 DIAGNOSIS — Z3A.32 32 WEEKS GESTATION OF PREGNANCY: Primary | ICD-10-CM

## 2025-03-13 DIAGNOSIS — Z3A.30 30 WEEKS GESTATION OF PREGNANCY: ICD-10-CM

## 2025-03-13 DIAGNOSIS — Z36.89 ENCOUNTER FOR ULTRASOUND TO CHECK FETAL GROWTH: ICD-10-CM

## 2025-03-13 LAB
BILIRUBIN, UA POC OHS: NEGATIVE
BLOOD, UA POC OHS: ABNORMAL
CLARITY, UA POC OHS: CLEAR
COLOR, UA POC OHS: YELLOW
GLUCOSE, UA POC OHS: NEGATIVE
KETONES, UA POC OHS: NEGATIVE
LEUKOCYTES, UA POC OHS: ABNORMAL
NITRITE, UA POC OHS: NEGATIVE
PH, UA POC OHS: 7
PROTEIN, UA POC OHS: NEGATIVE
SPECIFIC GRAVITY, UA POC OHS: 1.02
UROBILINOGEN, UA POC OHS: 0.2

## 2025-03-13 PROCEDURE — 99999 PR PBB SHADOW E&M-EST. PATIENT-LVL II: CPT | Mod: PBBFAC,,, | Performed by: SPECIALIST

## 2025-03-13 PROCEDURE — 76815 OB US LIMITED FETUS(S): CPT | Mod: TC,PN

## 2025-03-13 NOTE — PROGRESS NOTES
Complaints today:none  Completing u/s for fetal growth , Good fetal movements reported No Bleeding or pains    /70   Wt 97.1 kg (214 lb 1.1 oz)   LMP 2024 (Exact Date)   BMI 36.74 kg/m²     29 y.o., at 32w0d by Estimated Date of Delivery: 25  Problem List[1]  OB History    Para Term  AB Living   4 2 2  1 2   SAB IAB Ectopic Multiple Live Births       2      # Outcome Date GA Lbr Aaron/2nd Weight Sex Type Anes PTL Lv   4 Current            3 Term 19 37w0d  2.778 kg (6 lb 2 oz) M  EPI  KELSEY      Complications: Preeclampsia   2 Term 17 37w0d  2.268 kg (5 lb) M  EPI  KELSEY      Complications: Preeclampsia   1 AB               Obstetric Comments   Patient reports pre-eclampsia at term with prior gestation       Dating reviewed    Allergies and problem list reviewed and updated    Medical and surgical history reviewed    Prenatal labs reviewed and updated    Physical Exam:  ABD: soft, gravid, nontender,     Assessment:  IUP 32 weeks  Previous Pre e    Plan:   Review u/s  follow up 2 Weeks, bleeding/pain precautions   kick counts, labor precautions                 [1]   Patient Active Problem List  Diagnosis    Prenatal care, subsequent pregnancy in first trimester    Hx of preeclampsia, prior pregnancy, currently pregnant, second trimester

## 2025-03-18 ENCOUNTER — PATIENT MESSAGE (OUTPATIENT)
Dept: OBSTETRICS AND GYNECOLOGY | Facility: CLINIC | Age: 30
End: 2025-03-18
Payer: COMMERCIAL

## 2025-03-27 ENCOUNTER — ROUTINE PRENATAL (OUTPATIENT)
Dept: OBSTETRICS AND GYNECOLOGY | Facility: CLINIC | Age: 30
End: 2025-03-27
Payer: COMMERCIAL

## 2025-03-27 VITALS
SYSTOLIC BLOOD PRESSURE: 120 MMHG | DIASTOLIC BLOOD PRESSURE: 74 MMHG | BODY MASS INDEX: 37.12 KG/M2 | WEIGHT: 216.25 LBS

## 2025-03-27 DIAGNOSIS — Z3A.34 34 WEEKS GESTATION OF PREGNANCY: Primary | ICD-10-CM

## 2025-03-27 PROCEDURE — 99999 PR PBB SHADOW E&M-EST. PATIENT-LVL II: CPT | Mod: PBBFAC,,, | Performed by: SPECIALIST

## 2025-03-27 PROCEDURE — 0502F SUBSEQUENT PRENATAL CARE: CPT | Mod: CPTII,S$GLB,, | Performed by: SPECIALIST

## 2025-03-27 NOTE — PROGRESS NOTES
Complaints today:none , Good fetal movements reported No Bleeding or pains    /74   Wt 98.1 kg (216 lb 4.3 oz)   LMP 2024 (Exact Date)   BMI 37.12 kg/m²     29 y.o., at 34w0d by Estimated Date of Delivery: 25  Problem List[1]  OB History    Para Term  AB Living   4 2 2  1 2   SAB IAB Ectopic Multiple Live Births       2      # Outcome Date GA Lbr Aaron/2nd Weight Sex Type Anes PTL Lv   4 Current            3 Term 19 37w0d  2.778 kg (6 lb 2 oz) M  EPI  KELSEY      Complications: Preeclampsia   2 Term 17 37w0d  2.268 kg (5 lb) M  EPI  KELSEY      Complications: Preeclampsia   1 AB               Obstetric Comments   Patient reports pre-eclampsia at term with prior gestation       Dating reviewed    Allergies and problem list reviewed and updated    Medical and surgical history reviewed    Prenatal labs reviewed and updated    Physical Exam:  ABD: soft, gravid, nontender,     Assessment:  IUP 34 weeks    Plan:   GBS on RTO  follow up 2 Weeks, bleeding/pain precautions   kick counts, labor precautions                 [1]   Patient Active Problem List  Diagnosis    Prenatal care, subsequent pregnancy in first trimester    Hx of preeclampsia, prior pregnancy, currently pregnant, second trimester

## 2025-04-03 ENCOUNTER — PATIENT MESSAGE (OUTPATIENT)
Dept: OTHER | Facility: OTHER | Age: 30
End: 2025-04-03
Payer: COMMERCIAL

## 2025-04-10 ENCOUNTER — ROUTINE PRENATAL (OUTPATIENT)
Dept: OBSTETRICS AND GYNECOLOGY | Facility: CLINIC | Age: 30
End: 2025-04-10
Payer: COMMERCIAL

## 2025-04-10 VITALS
SYSTOLIC BLOOD PRESSURE: 124 MMHG | DIASTOLIC BLOOD PRESSURE: 82 MMHG | BODY MASS INDEX: 37.24 KG/M2 | WEIGHT: 216.94 LBS

## 2025-04-10 DIAGNOSIS — Z3A.36 36 WEEKS GESTATION OF PREGNANCY: Primary | ICD-10-CM

## 2025-04-10 LAB
BILIRUBIN, UA POC OHS: NEGATIVE
BLOOD, UA POC OHS: ABNORMAL
CLARITY, UA POC OHS: CLEAR
COLOR, UA POC OHS: YELLOW
GLUCOSE, UA POC OHS: NEGATIVE
KETONES, UA POC OHS: 15
LEUKOCYTES, UA POC OHS: ABNORMAL
NITRITE, UA POC OHS: NEGATIVE
PH, UA POC OHS: 6
PROTEIN, UA POC OHS: NEGATIVE
SPECIFIC GRAVITY, UA POC OHS: 1.02
UROBILINOGEN, UA POC OHS: 0.2

## 2025-04-10 PROCEDURE — 99999 PR PBB SHADOW E&M-EST. PATIENT-LVL II: CPT | Mod: PBBFAC,,, | Performed by: SPECIALIST

## 2025-04-10 NOTE — PROGRESS NOTES
Complaints today:none , Good fetal movements reported No Bleeding or pains    /82   Wt 98.4 kg (216 lb 14.9 oz)   LMP 2024 (Exact Date)   BMI 37.24 kg/m²     29 y.o., at 36w0d by Estimated Date of Delivery: 25  Problem List[1]  OB History    Para Term  AB Living   4 2 2  1 2   SAB IAB Ectopic Multiple Live Births       2      # Outcome Date GA Lbr Aaron/2nd Weight Sex Type Anes PTL Lv   4 Current            3 Term 19 37w0d  2.778 kg (6 lb 2 oz) M  EPI  KELSEY      Complications: Preeclampsia   2 Term 17 37w0d  2.268 kg (5 lb) M  EPI  KELSEY      Complications: Preeclampsia   1 AB               Obstetric Comments   Patient reports pre-eclampsia at term with prior gestation       Dating reviewed    Allergies and problem list reviewed and updated    Medical and surgical history reviewed    Prenatal labs reviewed and updated    Physical Exam:  ABD: soft, gravid, nontender,   Cervix LTC    Assessment:  IUP 36 weeks    Plan:   GBS  D/C ASA  Tdap on RTO  follow up 1 Weeks, bleeding/pain precautions   kick counts, labor precautions                 [1]   Patient Active Problem List  Diagnosis    Prenatal care, subsequent pregnancy in first trimester    Hx of preeclampsia, prior pregnancy, currently pregnant, second trimester

## 2025-04-14 ENCOUNTER — PATIENT MESSAGE (OUTPATIENT)
Dept: OBSTETRICS AND GYNECOLOGY | Facility: CLINIC | Age: 30
End: 2025-04-14
Payer: COMMERCIAL

## 2025-04-16 ENCOUNTER — ROUTINE PRENATAL (OUTPATIENT)
Dept: OBSTETRICS AND GYNECOLOGY | Facility: CLINIC | Age: 30
End: 2025-04-16
Payer: COMMERCIAL

## 2025-04-16 ENCOUNTER — PATIENT MESSAGE (OUTPATIENT)
Dept: OBSTETRICS AND GYNECOLOGY | Facility: CLINIC | Age: 30
End: 2025-04-16

## 2025-04-16 VITALS
DIASTOLIC BLOOD PRESSURE: 62 MMHG | SYSTOLIC BLOOD PRESSURE: 124 MMHG | BODY MASS INDEX: 37.46 KG/M2 | WEIGHT: 218.25 LBS

## 2025-04-16 DIAGNOSIS — Z3A.36 36 WEEKS GESTATION OF PREGNANCY: Primary | ICD-10-CM

## 2025-04-16 DIAGNOSIS — R82.90 ABNORMAL URINE FINDINGS: ICD-10-CM

## 2025-04-16 PROCEDURE — 87086 URINE CULTURE/COLONY COUNT: CPT | Performed by: SPECIALIST

## 2025-04-16 PROCEDURE — 99999 PR PBB SHADOW E&M-EST. PATIENT-LVL II: CPT | Mod: PBBFAC,,, | Performed by: SPECIALIST

## 2025-04-16 NOTE — PROGRESS NOTES
Complaints today:Denies DFM headache edema, SOB CP , Good fetal movements reported No Bleeding or pains    /62   Wt 99 kg (218 lb 4.1 oz)   LMP 2024 (Exact Date)   BMI 37.46 kg/m²     29 y.o., at 36w6d by Estimated Date of Delivery: 25  Problem List[1]  OB History    Para Term  AB Living   4 2 2  1 2   SAB IAB Ectopic Multiple Live Births       2      # Outcome Date GA Lbr Aaron/2nd Weight Sex Type Anes PTL Lv   4 Current            3 Term 19 37w0d  2.778 kg (6 lb 2 oz) M  EPI  KELSEY      Complications: Preeclampsia   2 Term 17 37w0d  2.268 kg (5 lb) M  EPI  KELSEY      Complications: Preeclampsia   1 AB               Obstetric Comments   Patient reports pre-eclampsia at term with prior gestation       Dating reviewed    Allergies and problem list reviewed and updated    Medical and surgical history reviewed    Prenatal labs reviewed and updated    Physical Exam:  ABD: soft, gravid, nontender,     Assessment:  IUP 36 weeks    Plan:   Due to POB Hx, will offer MIL Monday/Tuesday  follow up Monday bleeding/pain precautions   kick counts, labor precautions               [1]   Patient Active Problem List  Diagnosis    Prenatal care, subsequent pregnancy in first trimester    Hx of preeclampsia, prior pregnancy, currently pregnant, second trimester

## 2025-04-18 LAB — BACTERIA UR CULT: NORMAL

## 2025-04-21 ENCOUNTER — ROUTINE PRENATAL (OUTPATIENT)
Dept: OBSTETRICS AND GYNECOLOGY | Facility: CLINIC | Age: 30
End: 2025-04-21
Payer: COMMERCIAL

## 2025-04-21 VITALS
SYSTOLIC BLOOD PRESSURE: 118 MMHG | WEIGHT: 217.38 LBS | BODY MASS INDEX: 37.31 KG/M2 | DIASTOLIC BLOOD PRESSURE: 68 MMHG

## 2025-04-21 DIAGNOSIS — Z3A.37 37 WEEKS GESTATION OF PREGNANCY: Primary | ICD-10-CM

## 2025-04-21 LAB
BILIRUBIN, UA POC OHS: NEGATIVE
BLOOD, UA POC OHS: NEGATIVE
CLARITY, UA POC OHS: CLEAR
COLOR, UA POC OHS: YELLOW
GLUCOSE, UA POC OHS: NEGATIVE
KETONES, UA POC OHS: 40
LEUKOCYTES, UA POC OHS: NEGATIVE
NITRITE, UA POC OHS: NEGATIVE
PH, UA POC OHS: 7
PROTEIN, UA POC OHS: ABNORMAL
SPECIFIC GRAVITY, UA POC OHS: 1.02
UROBILINOGEN, UA POC OHS: 0.2

## 2025-04-21 PROCEDURE — 99999 PR PBB SHADOW E&M-EST. PATIENT-LVL II: CPT | Mod: PBBFAC,,, | Performed by: SPECIALIST

## 2025-04-21 PROCEDURE — 0502F SUBSEQUENT PRENATAL CARE: CPT | Mod: CPTII,S$GLB,, | Performed by: SPECIALIST

## 2025-04-21 NOTE — PROGRESS NOTES
Complaints today:None  Pt denies headache DFM , n/v visual changes, Good fetal movements reported No Bleeding or pains  Pt with history Pre eclampsia t 37 weeks two prior pregnancies    /68   Wt 98.6 kg (217 lb 6 oz)   LMP 2024 (Exact Date)   BMI 37.31 kg/m²     29 y.o., at 37w4d by Estimated Date of Delivery: 25  Problem List[1]  OB History    Para Term  AB Living   4 2 2  1 2   SAB IAB Ectopic Multiple Live Births       2      # Outcome Date GA Lbr Aaron/2nd Weight Sex Type Anes PTL Lv   4 Current            3 Term 19 37w0d  2.778 kg (6 lb 2 oz) M  EPI  KELSEY      Complications: Preeclampsia   2 Term 17 37w0d  2.268 kg (5 lb) M  EPI  KELSEY      Complications: Preeclampsia   1 AB               Obstetric Comments   Patient reports pre-eclampsia at term with prior gestation       Dating reviewed    Allergies and problem list reviewed and updated    Medical and surgical history reviewed    Prenatal labs reviewed and updated    Physical Exam:  ABD: soft, gravid, nontender,   Cervix Ft 20 -3 vertex    Assessment:  IUP 37w4d  Pre E x 2 previous preg    Plan:   Discussed and rec MIL due to significant Prenatal history   Discussed unfavorable cervical exam and thus rec Cytotec serial induction  Discussed process and pt is agreeable  follow up [facility] 9PM FOR VAGINAL CYTOTEC, bleeding/pain precautions  kick counts, labor precautions                [1]   Patient Active Problem List  Diagnosis    Prenatal care, subsequent pregnancy in first trimester    Hx of preeclampsia, prior pregnancy, currently pregnant, second trimester

## 2025-04-24 ENCOUNTER — PATIENT MESSAGE (OUTPATIENT)
Dept: OBSTETRICS AND GYNECOLOGY | Facility: CLINIC | Age: 30
End: 2025-04-24
Payer: COMMERCIAL

## 2025-04-24 DIAGNOSIS — R52 PAIN: Primary | ICD-10-CM

## 2025-04-25 RX ORDER — TRAMADOL HYDROCHLORIDE 50 MG/1
50 TABLET ORAL EVERY 6 HOURS PRN
Qty: 20 TABLET | Refills: 0 | Status: SHIPPED | OUTPATIENT
Start: 2025-04-25

## 2025-05-06 ENCOUNTER — POSTPARTUM VISIT (OUTPATIENT)
Dept: OBSTETRICS AND GYNECOLOGY | Facility: CLINIC | Age: 30
End: 2025-05-06
Payer: COMMERCIAL

## 2025-05-06 VITALS
WEIGHT: 201.06 LBS | SYSTOLIC BLOOD PRESSURE: 118 MMHG | DIASTOLIC BLOOD PRESSURE: 82 MMHG | BODY MASS INDEX: 34.51 KG/M2

## 2025-05-06 PROCEDURE — 99999 PR PBB SHADOW E&M-EST. PATIENT-LVL III: CPT | Mod: PBBFAC,,, | Performed by: SPECIALIST

## 2025-05-06 PROCEDURE — 0503F POSTPARTUM CARE VISIT: CPT | Mod: CPTII,S$GLB,, | Performed by: SPECIALIST

## 2025-05-06 NOTE — PROGRESS NOTES
30 yo BF 3 weeks s/p E- for prolapsed u cord  Pt doing well  Bottle feeding Denies pain, heavy vaginal bleeding, dysuria, flank pain. Denies PP depression  Past Medical History:   Diagnosis Date    Headache     Hypertension     Preeclampsia     with previous pregnancies       Past Surgical History:   Procedure Laterality Date     SECTION N/A 2025    Procedure:  SECTION;  Surgeon: Jaden Arana MD;  [facility]: [facility];  Service: OB/GYN;  Laterality: N/A;       Family History   Problem Relation Name Age of Onset    Breast cancer Neg Hx      Ovarian cancer Neg Hx         Social History     Socioeconomic History    Marital status: Single   Tobacco Use    Smoking status: Never    Smokeless tobacco: Never   Substance and Sexual Activity    Alcohol use: Not Currently    Drug use: No    Sexual activity: Not Currently     Partners: Male     Birth control/protection: None     Social Drivers of Health     Financial Resource Strain: Low Risk  (2025)    Overall Financial Resource Strain (CARDIA)     Difficulty of Paying Living Expenses: Not hard at all   Food Insecurity: No Food Insecurity (2025)    Hunger Vital Sign     Worried About Running Out of Food in the Last Year: Never true     Ran Out of Food in the Last Year: Never true   Transportation Needs: No Transportation Needs (2025)    PRAPARE - Transportation     Lack of Transportation (Medical): No     Lack of Transportation (Non-Medical): No   Physical Activity: Insufficiently Active (7/15/2024)    Exercise Vital Sign     Days of Exercise per Week: 2 days     Minutes of Exercise per Session: 30 min   Stress: No Stress Concern Present (2025)    [employer] [place] - Occupational Stress Questionnaire     Feeling of Stress : Not at all   Housing Stability: Low Risk  (2025)    Housing Stability Vital Sign     Unable to Pay for Housing in the Last Year: No     Number of Times Moved in the Last Year:  1     Homeless in the Last Year: No       Current Medications[1]    Review of patient's allergies indicates:   Allergen Reactions    Nickel Hives    Buspar [buspirone] Nausea And Vomiting       Review of System:   General: no chills, fever, night sweats, weight gain or weight loss  Psychological: no depression or suicidal ideation  Breasts: no new or changing breast lumps, nipple discharge or masses.  Respiratory: no cough, shortness of breath, or wheezing  Cardiovascular: no chest pain or dyspnea on exertion  Gastrointestinal: no abdominal pain, change in bowel habits, or black or bloody stools  Genito-Urinary: no incontinence, urinary frequency/urgency or vulvar/vaginal symptoms, pelvic pain or abnormal vaginal bleeding.  Musculoskeletal: no gait disturbance or muscular weakness     VSS  Appears well  Abd  soft incision well approx no d/c no erythema or crepitance    Discussed contraceptive options and pt desires Patch trial  Will have pt RTO 4 weeks final PP exam and begin contraception    I spent a total of 30 minutes on the day of the visit. This includes face to face time and non-face to face time preparing to see the patient (eg, review of tests), obtaining and/or reviewing separately obtained history, documenting clinical information in the electronic or other health record, independently interpreting results and communicating results to the patient/family/caregiver, or care coordinator.            [1]   Current Outpatient Medications   Medication Sig Dispense Refill    prenatal vit,yo 75/iron/folic (PRENATAL VITAMIN 1+1 ORAL) Take by mouth.      ibuprofen (ADVIL,MOTRIN) 600 MG tablet Take 1 tablet (600 mg total) by mouth every 6 (six) hours. (Patient not taking: Reported on 5/6/2025) 30 tablet 0    oxyCODONE-acetaminophen (PERCOCET) 5-325 mg per tablet Take 1 tablet by mouth every 4 (four) hours as needed for Pain. (Patient not taking: Reported on 5/6/2025) 30 tablet 0    traMADoL (ULTRAM) 50 mg tablet  Take 1 tablet (50 mg total) by mouth every 6 (six) hours as needed for Pain. (Patient not taking: Reported on 5/6/2025) 20 tablet 0     No current facility-administered medications for this visit.

## 2025-05-13 ENCOUNTER — PATIENT MESSAGE (OUTPATIENT)
Dept: OBSTETRICS AND GYNECOLOGY | Facility: CLINIC | Age: 30
End: 2025-05-13
Payer: COMMERCIAL

## 2025-05-29 ENCOUNTER — POSTPARTUM VISIT (OUTPATIENT)
Dept: OBSTETRICS AND GYNECOLOGY | Facility: CLINIC | Age: 30
End: 2025-05-29
Payer: COMMERCIAL

## 2025-05-29 VITALS — DIASTOLIC BLOOD PRESSURE: 88 MMHG | SYSTOLIC BLOOD PRESSURE: 136 MMHG | WEIGHT: 201.5 LBS | BODY MASS INDEX: 34.59 KG/M2

## 2025-05-29 PROCEDURE — 0503F POSTPARTUM CARE VISIT: CPT | Mod: CPTII,S$GLB,, | Performed by: SPECIALIST

## 2025-05-29 PROCEDURE — 99999 PR PBB SHADOW E&M-EST. PATIENT-LVL III: CPT | Mod: PBBFAC,,, | Performed by: SPECIALIST

## 2025-05-29 RX ORDER — NORELGESTROMIN AND ETHINYL ESTRADIOL 35; 150 UG/MG; UG/MG
1 PATCH TRANSDERMAL
Qty: 4 PATCH | Refills: 11 | Status: SHIPPED | OUTPATIENT
Start: 2025-05-29 | End: 2026-05-29

## 2025-05-29 RX ORDER — FERROUS GLUCONATE 324(38)MG
324 TABLET ORAL
Qty: 90 TABLET | Refills: 1 | Status: SHIPPED | OUTPATIENT
Start: 2025-05-29

## 2025-05-29 NOTE — PROGRESS NOTES
31 yo BF s/p e- for cord prolapse presents for final PO eval  Bottle feeding and doing well  Denies f/c pain flank pain n/v  Past Medical History:   Diagnosis Date    Headache     Hypertension     Preeclampsia     with previous pregnancies       Past Surgical History:   Procedure Laterality Date     SECTION N/A 2025    Procedure:  SECTION;  Surgeon: Jaden Arana MD;  Location: [facility];  Service: OB/GYN;  Laterality: N/A;       Family History   Problem Relation Name Age of Onset    Breast cancer Neg Hx      Ovarian cancer Neg Hx         Social History     Socioeconomic History    Marital status: Single   Tobacco Use    Smoking status: Never    Smokeless tobacco: Never   Substance and Sexual Activity    Alcohol use: Not Currently    Drug use: No    Sexual activity: Not Currently     Partners: Male     Birth control/protection: None     Social Drivers of Health     Financial Resource Strain: Low Risk  (2025)    Overall Financial Resource Strain (CARDIA)     Difficulty of Paying Living Expenses: Not hard at all   Food Insecurity: No Food Insecurity (2025)    Hunger Vital Sign     Worried About Running Out of Food in the Last Year: Never true     Ran Out of Food in the Last Year: Never true   Transportation Needs: No Transportation Needs (2025)    PRAPARE - Transportation     Lack of Transportation (Medical): No     Lack of Transportation (Non-Medical): No   Physical Activity: Insufficiently Active (7/15/2024)    Exercise Vital Sign     Days of Exercise per Week: 2 days     Minutes of Exercise per Session: 30 min   Stress: No Stress Concern Present (2025)    [employer] - Occupational Stress Questionnaire     Feeling of Stress : Not at all   Housing Stability: Low Risk  (2025)    Housing Stability Vital Sign     Unable to Pay for Housing in the Last Year: No     Number of Times Moved in the Last Year: 1     Homeless in the Last  Year: No       Current Medications[1]    Review of patient's allergies indicates:   Allergen Reactions    Nickel Hives    Buspar [buspirone] Nausea And Vomiting       Review of System:   General: no chills, fever, night sweats, weight gain or weight loss  Psychological: no depression or suicidal ideation  Breasts: no new or changing breast lumps, nipple discharge or masses.  Respiratory: no cough, shortness of breath, or wheezing  Cardiovascular: no chest pain or dyspnea on exertion  Gastrointestinal: no abdominal pain, change in bowel habits, or black or bloody stools  Genito-Urinary: no incontinence, urinary frequency/urgency or vulvar/vaginal symptoms, pelvic pain or abnormal vaginal bleeding.  Musculoskeletal: no gait disturbance or muscular weakness     Appears well  VSS  Abd  soft incision well healed NT    Discussed release of restrictions  Discussed contraceptive options and pt desires Xulane patch  Dosing discussed  Sunday start  Release restrictions and RTO 1 year/prn    I spent a total of 30 minutes on the day of the visit. This includes face to face time and non-face to face time preparing to see the patient (eg, review of tests), obtaining and/or reviewing separately obtained history, documenting clinical information in the electronic or other health record, independently interpreting results and communicating results to the patient/family/caregiver, or care coordinator.            [1]   Current Outpatient Medications   Medication Sig Dispense Refill    prenatal vit,yo 75/iron/folic (PRENATAL VITAMIN 1+1 ORAL) Take by mouth.      ferrous gluconate (FERGON) 324 MG tablet Take 1 tablet (324 mg total) by mouth daily with breakfast. 90 tablet 1    norelgestromin-ethinyl estradiol 150-35 mcg/24 hr Place 1 patch onto the skin every 7 days. 4 patch 11     No current facility-administered medications for this visit.

## 2025-07-14 ENCOUNTER — PATIENT MESSAGE (OUTPATIENT)
Dept: OBSTETRICS AND GYNECOLOGY | Facility: CLINIC | Age: 30
End: 2025-07-14
Payer: COMMERCIAL

## 2025-07-28 ENCOUNTER — PATIENT MESSAGE (OUTPATIENT)
Dept: OBSTETRICS AND GYNECOLOGY | Facility: CLINIC | Age: 30
End: 2025-07-28
Payer: COMMERCIAL

## 2025-07-28 RX ORDER — NORETHINDRONE ACETATE AND ETHINYL ESTRADIOL .02; 1 MG/1; MG/1
1 TABLET ORAL DAILY
Qty: 84 TABLET | Refills: 3 | Status: SHIPPED | OUTPATIENT
Start: 2025-07-28 | End: 2026-07-28

## 2025-08-04 NOTE — PROGRESS NOTES
Neurology Headache Clinic - [facility]  New Patient Visit     Subjective      REFERRAL SOURCE  No ref. provider found          CHIEF COMPLAINT/REASON FOR REFERRAL  Headache     HISTORY OF PRESENT ILLNESS  Rin Baum is a 30 y.o. woman with no other medical problems who is presenting to the [facility] for an office visit with a chief complaint of headache.     The patient states that they began to experience headaches around early 20's without any cause. At that time, headache was bad. Light sensitivity and aversion reaction. She says that headache slowly worsened over the last 9 years in intensity and frequency and now she will have headache on 12 days per month. On those days, she will try ibuprofen and Excedrin. She takes headache medicine every day she has a headache. She will miss around 2 days per month.     NPV headache characteristics:  Headache Frequency:        Total: 12        Disablin     Duration of attacks: days  Timing to max pain: hours   Time of day when headache is worse?: No   Headache location:    bilateral temporal   bilateral frontal  Quality: expanding/ballooning  Intensity: moderate and moderate to severe  Associated symptoms: photophobia, phonophobia, and aggravation with routine physical activity  Unilateral cranial autonomic features or restlessness: none  Premonitory symptoms: none  Aura symptoms:   Type: none   Duration: migraine aura duration: none  Headache Red Flags:        New (or very out-of-proportion to previous) daily, continuous, and progressive headache in a patient over 50 (especially if subacute): No         Fevers/Drenching Night Sweats/Unintended Weight Loss/Hx of Cancer: No         Focal weakness: No         Severe 10/10 headache in <10 seconds (Thunderclap onset): No         Start a headache with coughing/sneezing/bending over: No        Headache absolutely worse with certain positions No         Start a headache with  "exercise or exertion: No        Double vision: No         Transient Visual Obscurations: No         Loss of color vision: No         Pulsatile or whooshing tinnitus: No   Triggers: no  Neck pain: no Radiation none  Jaw pain/cramping with chewing, e.g., starts/stops when chewing due to pain/fatigue, lockjaw/decreased opening or cramping (including tongue) when eating: No   Symptoms of dysautonomia: none     Prior non-pharm treatments (CBT-Pain, PT, chiropractor, acupuncture, massage): No   History of asthma, constipation, kidney stones: No   Psychiatric comorbidities: No   History of Head Trauma: No   Hypertension, Hyperlipidemia: No   Prior stroke: No   Coronary artery disease: No   Vascular malformation or aneurysm: No   Peripheral Vascular disease / Raynaud's: No     Caffeine use: Yes - 2 cups per week and Excedrin    Sleep comorbidities: Snoring present, witnessed apneas absent, sleep study No      Family history of headaches: No      Last dilated eye exam: over 1 year ago   Any abnormalities? No      Menstrual status:  Did attacks start around menarche? No   Does the patient currently get their period? Yes  Worsening of migraine during menses? No   Is the patient currently pregnant or planning pregnancy in the near future? No   Is the patient menopausal or post-menopausal? No   Using HRT? combined patch     Social History:  The patient lives in [place], [place].   Employment: Yes  Tobacco use: No   Alcohol use: No   Substances: No   Mood: "It's ok"    The patient is unable to do the following activities easily because of their pain:  cooking or preparing meals, cleaning, and shopping      ----------------     Current Acute Headache Medications:  -- Ibuprofen  -- Excedrin     Number of Days with acute medication use per week: 3      Current Prophylactic Headache Medications:  --     Previous headache treatment that was ineffective or not tolerated:  Acute:   Preventive: amitriptyline  Devices:     Procedures: "     ----------------     Past Medical History:   Diagnosis Date    Headache     Hypertension     Preeclampsia     with previous pregnancies        Medications Ordered Prior to Encounter[1]     REVIEW OF SYSTEMS  As above     Objective      PHYSICAL EXAMINATION    not currently breastfeeding.     General Exam: The patient is in no acute distress.  Psychiatric: The patient is alert, interactive, and has appropriate mood and affect.    Head and Neck:    Right-sided tenderness: None  Left-sided tenderness: None    Cervical ROM (normal flexion 50'; extension 80'; lateral flexion 45'; rotation 85'): Normal   Cervical facet loading: Negative bilateral  Spurling's sign: Negative bilateral     Neurologic Examination:  Mental Status: Mental status is normal to conversation. The patient is able to recall the details of their medical history without difficulty. Speech is clear. Language is grossly intact.    Cranial Nerves: Facial symmetry and strength is intact. Tongue protrudes in the midline.   Sensory Examination:  Equal and intact to touch at the arms and legs.  Coordination: No dysmetria on finger-nose-finger testing  Gait: Normal gait.     Motor/Reflexes:      Right Left   C5 Shoulder Abduction  5  5   C5/6 Elbow Flexion    5  5   C6 Wrist Extension  5  5   C7 Elbow Extension   5  5   C8/T1 (ulnar) First dorsal interosseous  5  5   C8/T1 (median) Abductor pollicis brevis 5 5        Right Left   L2/3 Iliopsoas  Hip flexion  5  5   L3/4 Quadriceps Knee Extension  5  5   L4/5 Tib Anterior Ankle Dorsiflexion   5  5   L5 Extensor Hallicus Longus Great toe extension  5  5   S1/S2 Gastroc/Soleus Plantar Flexion  5  5      Right Left   Biceps DTR 2+ 2+   Triceps DTR 2+ 2+   Patellar DTR 2+ 2+   Achilles DTR 2+ 2+   Herrera Absent  Absent   Clonus Absent Absent   Babinski Down-going Down-going       Visual Exam:    Pupils: Not Tested  Visual fields: Tested - Normal  Funduscopic exam: Not Tested  Color Vision: Not  Tested  Extraocular movements: Tested - Normal  Nystagmus: Tested - Normal  Saccades: Not Tested  Pursuits: Tested - Normal    ----------------     DIAGNOSTIC DATA     Laboratory Data:     Lab Results   Component Value Date    WBC 21.51 (H) 04/23/2025    HGB 6.2 (L) 04/23/2025    HCT 20.3 (L) 04/23/2025    MCV 77 (L) 04/23/2025     04/23/2025           CMP  Sodium   Date Value Ref Range Status   10/19/2024 134 (L) 136 - 145 mmol/L Final     Potassium   Date Value Ref Range Status   10/19/2024 4.0 3.5 - 5.1 mmol/L Final     Comment:     Anion Gap reference range revised on 4/28/2023  Specimen slightly hemolyzed       Chloride   Date Value Ref Range Status   10/19/2024 105 95 - 110 mmol/L Final     CO2   Date Value Ref Range Status   10/19/2024 20 (L) 22 - 31 mmol/L Final     Glucose   Date Value Ref Range Status   10/19/2024 87 70 - 110 mg/dL Final     Comment:     The ADA recommends the following guidelines for fasting glucose:    Normal:       less than 100 mg/dL    Prediabetes:  100 mg/dL to 125 mg/dL    Diabetes:     126 mg/dL or higher       BUN   Date Value Ref Range Status   10/19/2024 10 7 - 18 mg/dL Final     Creatinine   Date Value Ref Range Status   10/19/2024 0.43 (L) 0.50 - 1.40 mg/dL Final     Calcium   Date Value Ref Range Status   10/19/2024 9.7 8.4 - 10.2 mg/dL Final     Total Protein   Date Value Ref Range Status   10/19/2024 7.6 6.0 - 8.4 g/dL Final     Albumin   Date Value Ref Range Status   10/19/2024 4.1 3.5 - 5.2 g/dL Final     Total Bilirubin   Date Value Ref Range Status   10/19/2024 0.5 0.2 - 1.3 mg/dL Final     Alkaline Phosphatase   Date Value Ref Range Status   10/19/2024 51 38 - 145 U/L Final     AST   Date Value Ref Range Status   10/19/2024 39 (H) 14 - 36 U/L Final     ALT   Date Value Ref Range Status   10/19/2024 23 0 - 35 U/L Final     Anion Gap   Date Value Ref Range Status   10/19/2024 9 5 - 12 mmol/L Final     Comment:     Anion Gap reference range revised on 4/28/2023      eGFR   Date Value Ref Range Status   10/19/2024 >60 >60 mL/min/1.73 m^2 Final        Imaging Data:    Xray:      CT:       MRI:        ----------------     Assessment & Plan      Rin Baum is a 30 y.o. woman with no other medical problems who is presenting to the [facility] for an office visit with a chief complaint of headache.     The patient states that they began to experience headaches around early 20's without any cause. At that time, headache was bad. Light sensitivity and aversion reaction. She says that headache slowly worsened over the last 9 years in intensity and frequency and now she will have headache on 12 days per month. On those days, she will try ibuprofen and Excedrin. She takes headache medicine every day she has a headache. She will miss around 2 days per month.    Exam non-focal. At this point, the patient likely has high frequency episodic migraine without aura. We discussed options for prevention and decided to try topiramate 25 mg at night. She can try imitrex 100 mg instead of rizatriptan 10 mg. Will defer imaging for now and if she doesn't improve then we will plan to get MRI Brain    Regarding prescription management, an active and shared decision was made to start topiramate 25 mg at night and try imitrex 100 mg    Migraine without aura, episodic  Snoring    -- Diagnostic studies and referrals recommended: Home sleep study  -- Preventive: Topiramate 25 mg at night. Side effects discussed.  -- Supplements: Try Magnesium (oxide, glycinate, citrate, or combination) 400 mg by mouth twice per day (Side effect: stomach upset). Find at local [employer] or [employer] store. Try Riboflavin (VitB2) 200 mg by mouth twice per day (Side effect: bright yellow urine). Find at [employer] food store (Whole foods, etc.). Alternatively, there is a combination pill that you can try that has 600 mg of magnesium citrate (can cause loose stools), 400 mg Riboflavin,  "300 mg CoQ10, 3 mg Melatonin, and 4000 IU VitD3 called MigraineMD that you can try at night.  -- Abortive: Imitrex 100 mg. You can take your triptan medication 2 times within a 24 hour period after waiting 2 hours between doses. Do not mix more than one triptan or ergotamine medication within the same 24 hours. Take early and combine with other medications for added benefit. Side effects include: nausea, fatigue, chest/jaw tightness (not dangerous). Side effects discussed.  -- Nausea/Vomiting:   -- Medication overuse discussed. Limit the use of as needed medications to less than 2 to 3 days per week or 10 days per month to reduce the risk of medication overuse complications.  -- Future options:      -- Recommend lifestyle modifications including the SEEDS for success in headache management, including Sleep hygiene, Exercising regularly, Eating healthy and regular meals, Drinking water and maintaining a headache Diary, and Stress reduction.  -- Therapeutic education and advocacy:        -Access the Migraine Toolkit, [facility] Migraine Patient Toolkit        -Visit the American Migraine Foundation (americanmigrainefoundation.org) website and the Move Against Migraine Facebook group for additional patient education    Pain Psychology Resources:  -- "Harnessing the Power of your Thoughts for Pain Control" - [facility] [employer] [place] Back Pain Education Day 2016.   -- "Pain Catastrophizing" - Alen Pang education Youtube channel  -- Free 8-week Mindfulness Course - [place] Mindfulness-Based Stress Reduction  -- Fwtoeu0Hathf Free Valente for stress reduction developed by the [employer] for [employer]       -- Follow-up recommended: 1 month     The total time spent for evaluation and management on including reviewing separately obtained history, performing a medically appropriate exam and evaluation, documenting clinical information in the health record, independently interpreting results and " communicating them to the patient/family/caregiver, and ordering medications/tests/procedures was 21 minutes.    Level 4 by Medical Decision-Making.    ---    Jordan Luis MD  Headache and Pain Management  [facility] [place], [place]         [1]   Current Outpatient Medications on File Prior to Visit   Medication Sig Dispense Refill    ferrous gluconate (FERGON) 324 MG tablet Take 1 tablet (324 mg total) by mouth daily with breakfast. 90 tablet 1    norelgestromin-ethinyl estradiol 150-35 mcg/24 hr Place 1 patch onto the skin every 7 days. 4 patch 11    norethindrone-ethinyl estradiol (MICROGESTIN 1/20) 1-20 mg-mcg per tablet Take 1 tablet by mouth once daily. 84 tablet 3    prenatal vit,yo 75/iron/folic (PRENATAL VITAMIN 1+1 ORAL) Take by mouth.       No current facility-administered medications on file prior to visit.

## 2025-08-05 ENCOUNTER — OFFICE VISIT (OUTPATIENT)
Dept: NEUROLOGY | Facility: CLINIC | Age: 30
End: 2025-08-05
Payer: COMMERCIAL

## 2025-08-05 DIAGNOSIS — R06.83 SNORING: Primary | ICD-10-CM

## 2025-08-05 DIAGNOSIS — G43.009 MIGRAINE WITHOUT AURA AND WITHOUT STATUS MIGRAINOSUS, NOT INTRACTABLE: ICD-10-CM

## 2025-08-05 PROCEDURE — 1159F MED LIST DOCD IN RCRD: CPT | Mod: CPTII,S$GLB,, | Performed by: INTERNAL MEDICINE

## 2025-08-05 PROCEDURE — 99999 PR PBB SHADOW E&M-EST. PATIENT-LVL II: CPT | Mod: PBBFAC,,, | Performed by: INTERNAL MEDICINE

## 2025-08-05 PROCEDURE — 1160F RVW MEDS BY RX/DR IN RCRD: CPT | Mod: CPTII,S$GLB,, | Performed by: INTERNAL MEDICINE

## 2025-08-05 PROCEDURE — 99214 OFFICE O/P EST MOD 30 MIN: CPT | Mod: S$GLB,,, | Performed by: INTERNAL MEDICINE

## 2025-08-05 RX ORDER — SUMATRIPTAN SUCCINATE 100 MG/1
100 TABLET ORAL 2 TIMES DAILY PRN
Qty: 10 TABLET | Refills: 11 | Status: SHIPPED | OUTPATIENT
Start: 2025-08-05 | End: 2026-08-05

## 2025-08-05 RX ORDER — TOPIRAMATE 25 MG/1
25 TABLET, FILM COATED ORAL NIGHTLY
Qty: 30 TABLET | Refills: 11 | Status: SHIPPED | OUTPATIENT
Start: 2025-08-05 | End: 2026-08-05

## 2025-08-05 RX ORDER — RIZATRIPTAN BENZOATE 10 MG/1
10 TABLET, ORALLY DISINTEGRATING ORAL 2 TIMES DAILY PRN
Qty: 10 TABLET | Refills: 11 | Status: SHIPPED | OUTPATIENT
Start: 2025-08-05 | End: 2025-08-05

## 2025-08-05 NOTE — PATIENT INSTRUCTIONS
-- topiramate 25 mg nightly  -- try sumatriptan by mouth early in the headache. You can take your triptan medication 2 times within a 24 hour period after waiting 2 hours between doses. Do not mix more than one triptan or ergotamine medication within the same 24 hours. Take early and combine with other medications for added benefit. Side effects include: nausea, fatigue, chest/jaw tightness (not dangerous)  -- follow-up in 1 month

## 2025-08-20 ENCOUNTER — TELEPHONE (OUTPATIENT)
Dept: NEUROLOGY | Facility: CLINIC | Age: 30
End: 2025-08-20
Payer: COMMERCIAL